# Patient Record
Sex: MALE | Race: WHITE | NOT HISPANIC OR LATINO | Employment: FULL TIME | URBAN - METROPOLITAN AREA
[De-identification: names, ages, dates, MRNs, and addresses within clinical notes are randomized per-mention and may not be internally consistent; named-entity substitution may affect disease eponyms.]

---

## 2017-10-02 ENCOUNTER — HISTORICAL (OUTPATIENT)
Dept: LAB | Facility: HOSPITAL | Age: 52
End: 2017-10-02

## 2019-02-18 ENCOUNTER — HISTORICAL (OUTPATIENT)
Dept: LAB | Facility: HOSPITAL | Age: 54
End: 2019-02-18

## 2020-06-05 ENCOUNTER — HISTORICAL (OUTPATIENT)
Dept: ADMINISTRATIVE | Facility: HOSPITAL | Age: 55
End: 2020-06-05

## 2021-11-15 ENCOUNTER — HISTORICAL (OUTPATIENT)
Dept: ADMINISTRATIVE | Facility: HOSPITAL | Age: 56
End: 2021-11-15

## 2021-11-15 LAB
ABS NEUT (OLG): 5.5 X10(3)/MCL (ref 2.1–9.2)
ALBUMIN SERPL-MCNC: 4.4 GM/DL (ref 3.4–5)
ALBUMIN/GLOB SERPL: 1.91 {RATIO} (ref 1.5–2.5)
ALP SERPL-CCNC: 75 UNIT/L (ref 38–126)
ALT SERPL-CCNC: 32 UNIT/L (ref 7–52)
APPEARANCE, UA: CLEAR
AST SERPL-CCNC: 20 UNIT/L (ref 15–37)
BACTERIA #/AREA URNS AUTO: NORMAL /HPF
BILIRUB SERPL-MCNC: 0.7 MG/DL (ref 0.2–1)
BILIRUB UR QL STRIP: NEGATIVE MG/DL
BILIRUBIN DIRECT+TOT PNL SERPL-MCNC: 0.2 MG/DL (ref 0–0.5)
BILIRUBIN DIRECT+TOT PNL SERPL-MCNC: 0.5 MG/DL
BUN SERPL-MCNC: 14 MG/DL (ref 7–18)
CALCIUM SERPL-MCNC: 9.5 MG/DL (ref 8.5–10.1)
CHLORIDE SERPL-SCNC: 103 MMOL/L (ref 98–107)
CHOLEST SERPL-MCNC: 137 MG/DL (ref 0–200)
CHOLEST/HDLC SERPL: 3.7 {RATIO}
CO2 SERPL-SCNC: 25 MMOL/L (ref 21–32)
COLOR UR: YELLOW
CREAT SERPL-MCNC: 0.81 MG/DL (ref 0.6–1.3)
ERYTHROCYTE [DISTWIDTH] IN BLOOD BY AUTOMATED COUNT: 12.9 % (ref 11.5–17)
EST CREAT CLEARANCE SER (OHS): 279.42 ML/MIN
EST. AVERAGE GLUCOSE BLD GHB EST-MCNC: 103 MG/DL
GLOBULIN SER-MCNC: 2.3 GM/DL (ref 1.2–3)
GLUCOSE (UA): NEGATIVE MG/DL
GLUCOSE SERPL-MCNC: 108 MG/DL (ref 74–106)
HBA1C MFR BLD: 5.2 % (ref 4.4–6.4)
HCT VFR BLD AUTO: 46.7 % (ref 42–52)
HDLC SERPL-MCNC: 37 MG/DL (ref 35–60)
HGB BLD-MCNC: 15.6 GM/DL (ref 14–18)
HGB UR QL STRIP: NEGATIVE UNIT/L
KETONES UR QL STRIP: NEGATIVE MG/DL
LDLC SERPL CALC-MCNC: 61 MG/DL (ref 0–129)
LEUKOCYTE ESTERASE UR QL STRIP: NEGATIVE UNIT/L
LYMPHOCYTES # BLD AUTO: 1.9 X10(3)/MCL (ref 0.6–3.4)
LYMPHOCYTES NFR BLD AUTO: 25 % (ref 13–40)
MCH RBC QN AUTO: 30.7 PG (ref 27–31.2)
MCHC RBC AUTO-ENTMCNC: 33 GM/DL (ref 32–36)
MCV RBC AUTO: 92 FL (ref 80–94)
MONOCYTES # BLD AUTO: 0.3 X10(3)/MCL (ref 0.1–1.3)
MONOCYTES NFR BLD AUTO: 3.4 % (ref 0.1–24)
NEUTROPHILS NFR BLD AUTO: 71.6 % (ref 47–80)
NITRITE UR QL STRIP.AUTO: NEGATIVE
PH UR STRIP: 6.5 [PH]
PLATELET # BLD AUTO: 211 X10(3)/MCL (ref 130–400)
PMV BLD AUTO: 9.5 FL (ref 9.4–12.4)
POTASSIUM SERPL-SCNC: 4.5 MMOL/L (ref 3.5–5.1)
PROT SERPL-MCNC: 6.7 GM/DL (ref 6.4–8.2)
PROT UR QL STRIP: NORMAL MG/DL
PSA SERPL-MCNC: 0.49 NG/ML (ref 0–3.5)
RBC # BLD AUTO: 5.08 X10(6)/MCL (ref 4.7–6.1)
RBC #/AREA URNS HPF: NORMAL /HPF
SODIUM SERPL-SCNC: 139 MMOL/L (ref 136–145)
SP GR UR STRIP: 1.02
SQUAMOUS EPITHELIAL, UA: NORMAL /LPF
TRIGL SERPL-MCNC: 113 MG/DL (ref 30–150)
TSH SERPL-ACNC: 1.28 MIU/ML (ref 0.35–4.94)
UROBILINOGEN UR STRIP-ACNC: 0.2 MG/DL
VLDLC SERPL CALC-MCNC: 22.6 MG/DL
WBC # SPEC AUTO: 7.7 X10(3)/MCL (ref 4.5–11.5)
WBC #/AREA URNS AUTO: NORMAL /[HPF]

## 2022-04-07 ENCOUNTER — HISTORICAL (OUTPATIENT)
Dept: ADMINISTRATIVE | Facility: HOSPITAL | Age: 57
End: 2022-04-07

## 2022-04-24 VITALS
OXYGEN SATURATION: 96 % | SYSTOLIC BLOOD PRESSURE: 136 MMHG | BODY MASS INDEX: 38.36 KG/M2 | DIASTOLIC BLOOD PRESSURE: 86 MMHG | WEIGHT: 315 LBS | HEIGHT: 76 IN

## 2022-05-01 NOTE — HISTORICAL [facility] CERNER
This is a historical note converted from Evan. Formatting and pictures may have been removed.  Please reference Evan for original formatting and attached multimedia. Chief Complaint  CPX/FASTING  History of Present Illness  Patient is here today?for wellness CPX. ?He continues to take atorvastatin 20 mg without side effects.? He did get J&J Covid vaccine and last week got a Moderna?Covid booster.? He has not lost any weight over the last year?and is still 428 pounds.? Most of his complaints today are related to his?weight?including?hip, knee?and ankle pain.? We had a long discussion about?trying to count calories?to help him lose weight. ?He is not interested in bariatric surgery.? Encouraged him to?increase low impact?exercise such as brisk walking?and try to get at least 150 minutes a week.? He does have sleep apnea and complains of increased fatigue. ?He is using an oral appliance?but is having difficulty with it. ?I urged him to contact his dentist who prescribed?the device.? His repeat colonoscopy is overdue.  Review of Systems  GENERAL:?no? unexplained wt ?loss or gain, no fever, + fatigue, no chills, night sweats or ?weakness  HEENT: no?? sore throat, ?ear pain, ?sinus pressure, ?nasal congestion, or ?rhinorrhea  VISION:?no ?vision changes, ?glaucoma, cataracts--legally blind  CARDIAC: no? chest pain,??palpitations,?Dyspnea on exertion, ?orthopnea  RESPIRATORY:?no??cough,?wheezing, sputum production or?SOB-- got covid vaccine --JJ covid vaccine and modernal booster  GI: no???abdominal pain, n&v,?constipation, diarrhea,??blood in stool or_no ?family history of colon cancer?_  :?no? dysuria, ?hematuria, ?frequency, urgency, ?incontinence,? testicular pain/swelling,?_no ?family history of prostate cancer_  [facility]:? no? myalgia, ?weakness, edema,? knees/hips/ankles LBP arthralgia, or ?joint effusion  SKIN:? No?rash, hives,?itching or?sores  NEURO:? No?headaches, numbness, tingling,? weakness, or  ?dizziness  PSYCH:??+work stressors??anxiety, depression, ?irritability, ?suicidal ideation or hallucinations  ENDO:? No ?polyuria, ?polydipsia, ?polyphagia  HEME:? No Bruising, lymphadenopathy, bleeding disorders ?or?signs of anemia  Physical Exam  Vitals & Measurements  HR:?82(Peripheral)? BP:?136/86?  HT:?194.20?cm? WT:?194.000?kg? BMI:?51.44?  GENERAL: NAD, alert and oriented x 3  SKIN:? no rash or abnormal appearing skin lesions  HEENT:? PERRLA, EOMI, mouth wnl, throat wnl, EAC and TM wnl bilaterally  NECK:? FROM, no lymphadenopathy, no thyroid abnormalities palpable  CHEST:? CTA bilaterally no wheezes, crackles or rubs  CARDIAC:? RRR, no murmurs audible  ABDOMEN:? Soft, nontender, nondistended, NBSx4,?no rebound or guarding, no HSM  EXTREMITIES:? no clubbing, cyanosis, or edema.? joints wnl. +2 DP/PT pulse bilaterally  NEURO:? no sensory or motor deficits noted. CN II-XII intact. Gait wnl.?  GENITAL: normal testes, no hernia  RECTAL: no hemorrhoids or fissures, prostate slightly enlarged, smooth surface, unable to palpate proximal prostate gland due to size  Assessment/Plan  1.?Wellness examination?Z00.00  CBC, CMP, FLP, PSA, U/A, A1C, TSH ?colonoscopy 2015 due 2020 Dr Omalley, Encourage pt to increase cardiovascular exercise and attempt to obtain at least 150 minutes of moderate aerobic exercise per week or 75 minutes of vigorous aerobic exercise weekly.  Ordered:  CBC w/ Auto Diff, Routine collect, 11/15/21 9:15:00 CST, Blood, Order for future visit, Stop date 11/15/21 9:15:00 CST, Lab Collect, Wellness examination  Obstructive sleep apnea of adult, 11/15/21 9:15:00 CST  Clinic Follow-Up Wellness, *Est. 11/15/22 3:00:00 CST, Order for future visit, Wellness examination  Hypercholesterolemia  H/O hyperglycemia  Obstructive sleep apnea of adult, [facility] AFP  Comprehensive Metabolic Panel, Routine collect, 11/15/21 9:15:00 CST, Blood, Order for future visit, Stop date 11/15/21 9:15:00 CST, Lab Collect,  Wellness examination  Hypercholesterolemia, 11/15/21 9:15:00 CST  External Referral, colonoscopy due, GI, Dr Omalley, 11/15/21 9:17:00 CST, Wellness examination  Hemoglobin A1c, Routine collect, 11/15/21 9:15:00 CST, Blood, Order for future visit, Stop date 11/15/21 9:15:00 CST, Lab Collect, Wellness examination  H/O hyperglycemia, 11/15/21 9:15:00 CST  Lab Collection Request, 11/15/21 9:15:00 CST, [facility] AMB - AFP, 11/15/21 9:15:00 CST, Wellness examination  Lipid Panel, Routine collect, 11/15/21 9:15:00 CST, Blood, Order for future visit, Stop date 11/15/21 9:15:00 CST, Lab Collect, Wellness examination  Hypercholesterolemia, 11/15/21 9:15:00 CST  Preventative Health Care Est 40-64 years [id number] PC, Wellness examination  Hypercholesterolemia  H/O hyperglycemia  Obstructive sleep apnea of adult, [facility] AMB - AFP, 11/15/21 9:16:00 CST  Thyroid Stimulating Hormone, Routine collect, 11/15/21 9:15:00 CST, Blood, Order for future visit, Stop date 11/15/21 9:15:00 CST, Lab Collect, Wellness examination  Obstructive sleep apnea of adult, 11/15/21 9:15:00 CST  Urinalysis no Reflex, Routine collect, Urine, Order for future visit, 11/15/21 9:15:00 CST, Stop date 11/15/21 9:15:00 CST, Nurse collect, Wellness examination  Hypercholesterolemia  ?  2.?Prostate cancer screening?Z12.5  ?PSA  Ordered:  Prostate Specific Antigen, Routine collect, 11/15/21 9:15:00 CST, Blood, Order for future visit, Stop date 11/15/21 9:15:00 CST, Lab Collect, Prostate cancer screening, 11/15/21 9:15:00 CST  ?  3.?Immunization due?Z23  ?flu shot today , shingrix, got JJ then moderna booster  Ordered:  Influenza Virus Vaccine, Inactivated, 0.5 mL, IM, Once, first dose 11/15/21 9:16:00 CST, stop date 11/15/21 9:16:00 CST  ?  4.?Hypercholesterolemia?E78.00  ?continue atorvastatin 20 mg  Ordered:  Clinic Follow-Up Wellness, *Est. 11/15/22 3:00:00 CST, Order for future visit, Wellness examination  Hypercholesterolemia  H/O hyperglycemia   Obstructive sleep apnea of adult, [facility]  Comprehensive Metabolic Panel, Routine collect, 11/15/21 9:15:00 CST, Blood, Order for future visit, Stop date 11/15/21 9:15:00 CST, Lab Collect, Wellness examination  Hypercholesterolemia, 11/15/21 9:15:00 CST  Lipid Panel, Routine collect, 11/15/21 9:15:00 CST, Blood, Order for future visit, Stop date 11/15/21 9:15:00 CST, Lab Collect, Wellness examination  Hypercholesterolemia, 11/15/21 9:15:00 CST  Preventative Health Care Est 40-64 years [id number] PC, Wellness examination  Hypercholesterolemia  H/O hyperglycemia  Obstructive sleep apnea of adult, [facility], 11/15/21 9:16:00 CST  Urinalysis no Reflex, Routine collect, Urine, Order for future visit, 11/15/21 9:15:00 CST, Stop date 11/15/21 9:15:00 CST, Nurse collect, Wellness examination  Hypercholesterolemia  ?  5.?H/O hyperglycemia?Z86.39  ?check A1C  Ordered:  Clinic Follow-Up Wellness, *Est. 11/15/22 3:00:00 CST, Order for future visit, Wellness examination  Hypercholesterolemia  H/O hyperglycemia  Obstructive sleep apnea of adult, [facility]  Hemoglobin A1c, Routine collect, 11/15/21 9:15:00 CST, Blood, Order for future visit, Stop date 11/15/21 9:15:00 CST, Lab Collect, Wellness examination  H/O hyperglycemia, 11/15/21 9:15:00 CST  [facility] Health Care Est 40-64 years [id number] PC, Wellness examination  Hypercholesterolemia  H/O hyperglycemia  Obstructive sleep apnea of adult, [facility], 11/15/21 9:16:00 CST  ?  6.?Obstructive sleep apnea of adult?G47.33  ?uses oral appliance  Ordered:  CBC w/ Auto Diff, Routine collect, 11/15/21 9:15:00 CST, Blood, Order for future visit, Stop date 11/15/21 9:15:00 CST, Lab Collect, Wellness examination  Obstructive sleep apnea of adult, 11/15/21 9:15:00 CST  Clinic Follow-Up Wellness, *Est. 11/15/22 3:00:00 CST, Order for future visit, Wellness examination  Hypercholesterolemia  H/O hyperglycemia  Obstructive sleep apnea of adult, [facility]  Preventative  Health Care Est 40-64 years [id number] PC, Wellness examination  Hypercholesterolemia  H/O hyperglycemia  Obstructive sleep apnea of adult, [facility] AMB - AFP, 11/15/21 9:16:00 CST  Thyroid Stimulating Hormone, Routine collect, 11/15/21 9:15:00 CST, Blood, Order for future visit, Stop date 11/15/21 9:15:00 CST, Lab Collect, Wellness examination  Obstructive sleep apnea of adult, 11/15/21 9:15:00 CST  ?  Orders:  atorvastatin, See Instructions, TAKE 1 TABLET BY MOUTH AT BEDTIME, # 90 tab(s), 3 Refill(s)  Referrals  External Referral, colonoscopy due, GI, Dr Omalley, 11/15/21 9:17:00 CST, Wellness examination  Clinic Follow-Up Wellness, *Est. 11/15/22 3:00:00 CST, Order for future visit, Wellness examination  Hypercholesterolemia  H/O hyperglycemia  Obstructive sleep apnea of adult, [facility] AFP   Problem List/Past Medical History  Ongoing  Acute gingival inflammation  Acute URI  Bilateral visual impairment  Cellulitis  Chronic upset stomach  H/O hyperglycemia  Immunization due  Morbid obesity  Numbness and tingling in left hand  Trapezius strain  Wellness examination  Historical  Cytomegaloviral mononucleosis  Obstructive sleep apnea of adult  Positive PPD  Serum low density lipoprotein/high density lipoprotein ratio measurement  Procedure/Surgical History  Colonoscopy (09/28/2015)  CYST REMOVED FROM RIGHT FOOT  right foot x 2  Tonsillectomy and adenoidectomy   Medications  atorvastatin 20 mg oral tablet, See Instructions, 3 refills  Influenza Virus Vaccine, Inactivated, 0.5 mL, IM, Once  Probiotic Formula (Bacillus Coagulans) oral capsule, 1 cap(s), Oral, Daily  Allergies  No Known Allergies  No Known Medication Allergies  Social History  Abuse/Neglect  No, 11/15/2021  No, 11/09/2020  No, 06/05/2020  No, 06/05/2020  No, 11/04/2019  Alcohol  Current, Liquor, 1-2 times per month, 11/15/2021  Current, 10/29/2018  Current, 1-2 times per month, 03/17/2018  Employment/School  Employed, 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[employer].., 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/2018  Employed, 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[employer]., 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/2018  Home/Environment  Lives with Children, Spouse., 10/29/2018  Lives with Children, Spouse., 10/25/2018  Tobacco  Former smoker, quit more than 30 days ago, No, 11/15/2021  Never (less than 100 in lifetime), No, 11/09/2020  Never (less than 100 in lifetime), N/A, 06/05/2020  Never (less than 100 in lifetime), N/A, 11/04/2019  Never (less than 100 in lifetime), N/A, 05/30/2019  Never (less than 100 in lifetime), N/A, 04/09/2019  Former smoker, quit more than 30 days ago, N/A, 02/20/2019  Former smoker, quit more than 30 days ago, N/A, 02/18/2019  Former smoker, Cigars, N/A, 10/29/2018  Light tobacco smoker, Cigars, N/A, 10/25/2018  Former smoker, 03/17/2018  Family History  Arthritis: Mother.  COPD (chronic obstructive pulmonary disease).: Father.  Diabetes mellitus type 2: Mother.  Drug addiction.: Sister.  Neuropathy: Father.  Peripheral vascular disease.: Father.  Primary malignant neoplasm of breast: Mother.  Immunizations  Vaccine Date Status Comments   COVID-19 mRNA, LNP-S, PF - Moderna 11/08/2021 Recorded    COVID-19, vector nr, [id number] - Ronaldo 03/09/2021 Recorded    influenza virus vaccine, inactivated 11/09/2020 Given    influenza virus vaccine, inactivated 11/04/2019 Given    influenza virus vaccine, inactivated - Not Given Expectation Not Necessary     influenza virus vaccine, inactivated 10/29/2018 Given    tetanus/diphth/pertuss (Tdap) adult/adol 10/23/2017 Recorded    tetanus/diphth/pertuss (Tdap) adult/adol 10/2017 Recorded    influenza virus vaccine, inactivated 11/28/2015 Given    tetanus/diphth/pertuss (Tdap) adult/adol 09/30/2008 Recorded    Health Maintenance  Health Maintenance  ???Pending?(in the next year)  ??? ??OverDue  ??? ? ? ?Alcohol Misuse Screening due??01/02/21??and every 1??year(s)  ??? ??Due?  ??? ? ? ?ADL Screening due??11/15/21??and every 1??year(s)  ??? ? ?  ?Aspirin Therapy for CVD Prevention due??11/15/21??and every 1??year(s)  ??? ? ? ?Depression Screening due??11/15/21??Unknown Frequency  ??? ? ? ?Zoster Vaccine due??11/15/21??Unknown Frequency  ??? ??Due In Future?  ??? ? ? ?Obesity Screening not due until??01/01/22??and every 1??year(s)  ???Satisfied?(in the past 1 year)  ??? ??Satisfied?  ??? ? ? ?Blood Pressure Screening on??11/15/21.??Satisfied by Ashok Anguiano MD  ??? ? ? ?Body Mass Index Check on??11/15/21.??Satisfied by Kourtney Acosta CMA  ??? ? ? ?Influenza Vaccine on??11/15/21.??Satisfied by Ashok Anguiano MD  ??? ? ? ?Obesity Screening on??11/15/21.??Satisfied by Kourtney Acosta CMA  ?

## 2022-06-15 PROBLEM — H54.3 VISUAL IMPAIRMENT IN BOTH EYES: Status: ACTIVE | Noted: 2022-06-15

## 2022-06-15 PROBLEM — E66.01 MORBID OBESITY: Status: ACTIVE | Noted: 2022-06-15

## 2022-06-15 PROBLEM — I10 PRIMARY HYPERTENSION: Status: ACTIVE | Noted: 2022-06-15

## 2022-06-16 PROBLEM — K59.00 CONSTIPATION: Status: ACTIVE | Noted: 2022-06-16

## 2022-07-25 PROCEDURE — 87070 CULTURE OTHR SPECIMN AEROBIC: CPT | Performed by: FAMILY MEDICINE

## 2022-11-26 PROBLEM — Z12.5 PROSTATE CANCER SCREENING: Status: ACTIVE | Noted: 2022-11-26

## 2022-11-26 PROBLEM — E78.00 HYPERCHOLESTEROLEMIA: Status: ACTIVE | Noted: 2022-11-26

## 2022-11-26 PROBLEM — R73.9 HYPERGLYCEMIA: Status: ACTIVE | Noted: 2022-11-26

## 2022-11-26 PROBLEM — G47.33 OSA ON CPAP: Status: ACTIVE | Noted: 2022-11-26

## 2022-11-26 PROBLEM — Z23 IMMUNIZATION DUE: Status: ACTIVE | Noted: 2022-11-26

## 2022-11-26 PROBLEM — Z00.00 ENCOUNTER FOR WELLNESS EXAMINATION IN ADULT: Status: ACTIVE | Noted: 2022-11-26

## 2023-02-27 PROBLEM — Z00.00 ENCOUNTER FOR WELLNESS EXAMINATION IN ADULT: Status: RESOLVED | Noted: 2022-11-26 | Resolved: 2023-02-27

## 2023-12-18 PROCEDURE — 86803 HEPATITIS C AB TEST: CPT | Performed by: FAMILY MEDICINE

## 2023-12-18 PROCEDURE — 87389 HIV-1 AG W/HIV-1&-2 AB AG IA: CPT | Performed by: FAMILY MEDICINE

## 2024-03-18 PROBLEM — Z00.00 ENCOUNTER FOR WELLNESS EXAMINATION IN ADULT: Status: RESOLVED | Noted: 2022-11-26 | Resolved: 2024-03-18

## 2024-04-01 NOTE — PROGRESS NOTES
Moira Vick    HPI: Mr. Moiar Vick is a 58 y.o. male in clinic today for non surgical weight loss. Pt here to discuss weight management and possibly weight loss medication.     Weight gain began during childhood.   Lowest weight pt has been in adult life: 183# during highschool, college 200#, slow increase since that time an more since his first child. Significant increase after he got off of medifast and through covid.   Highest weight     : 463#  Previous weight loss medications the patient has tried: none   Pervious diets the patient has tried:  medifast  Most weight the pt has lost: 140# on medifast but did not continue after   Wishes to get to a weight of 240# and has other goals of play music, move for functionally, does every he want to do and not feel limited, has a grandchild on the way. .     Current diet consists of more of a sweet tooth.   His current exercise includes none 0 times a week.   Pt does not have limitations but also feels like he cannot exercise at this time just do to inability to move much and ADLS make him SOB and break out into a sweat.     Pt is NOT interested in weight loss surgery     Labs (12/18/23): A1c: 5.7%, all other labs WNLs    HT:   Weights  NSWL Consult (04/02/2024): 463#  BMI: 62    Pertinent History:  HTN - [x] Yes   [] No  HLD - [x] Yes   [] No  DM  -  [] Yes   [x] No, pre DM. A1c: 5.7%  ELI - [x] Yes   [] No  GERD - [] Yes   [x] No  Anticoagulation- [] Yes   [x] No    Patient Care Team:  Ashok Anguiano MD as PCP - General (Family Medicine)     Subjective:     12 point review of systems conducted, negative except as stated in the history of present illness. See HPI for details.    Review of patient's allergies indicates:  No Known Allergies  Past Medical History:   Diagnosis Date    High serum low density lipoprotein (LDL) cholesterol     HTN (hypertension)     Hypercholesterolemia     Morbid obesity     Obstructive sleep apnea     wears oral appliance     Positive PPD     took 6 months meds    Trapezius strain     Visual impairment near total bilaterally      Past Surgical History:   Procedure Laterality Date    COLONOSCOPY W/ BIOPSIES AND POLYPECTOMY  2015    5 year/Dr. Omalley    CYST REMOVAL Right     FOOT    FOOT SURGERY      X2    TONSILLECTOMY AND ADENOIDECTOMY       Family History   Problem Relation Age of Onset    Arthritis Mother     Diabetes type II Mother     Breast cancer Mother     COPD Father     Neuropathy Father     Peripheral vascular disease Father     Drug abuse Sister      Social History     Tobacco Use    Smoking status: Former     Current packs/day: 0.00     Average packs/day: 0.5 packs/day for 2.0 years (1.0 ttl pk-yrs)     Types: Cigarettes, Cigars     Start date:      Quit date: 2018     Years since quittin.2    Smokeless tobacco: Never   Substance Use Topics    Alcohol use: Not Currently     Comment: rare    Drug use: Never      Current Outpatient Medications   Medication Instructions    atorvastatin (LIPITOR) 20 mg, Oral, Daily    fluticasone propionate (FLONASE) 50 mcg, Each Nostril, Daily    Lactobacillus rhamnosus GG (CULTURELLE) 10 billion cell capsule 1 capsule, Oral, Daily    losartan-hydrochlorothiazide 100-25 mg (HYZAAR) 100-25 mg per tablet 1 tablet, Oral, Daily       Objective:     Vital Signs (Most Recent):  Visit Vitals  BP (!) 152/90   Pulse 76   Ht 6' (1.829 m)   Wt (!) 210.4 kg (463 lb 12.8 oz)   BMI 62.90 kg/m²       Physical Exam:  General:  Alert and oriented.    Respiratory:  Lungs are clear to auscultation, Respirations are non-labored, Breath sounds are equal.    Cardiovascular:  Normal rate, Regular rhythm, No murmur.    Gastrointestinal:  Soft, Non-tender, Non-distended, Normal bowel sounds        Musculoskeletal:  Normal range of motion, Normal strength.    Integumentary:  Warm, Dry, Pink.    Neurologic:  Alert, Oriented.    Psychiatric:  Cooperative.      Assessment:         ICD-10-CM ICD-9-CM   1.  Hyperlipidemia, unspecified hyperlipidemia type  E78.5 272.4   2. Hypertension, unspecified type  I10 401.9   3. ELI (obstructive sleep apnea)  G47.33 327.23   4. Encounter for weight loss counseling  Z71.3 V65.3   5. Exercise counseling  Z71.82 V65.41   6. Morbid obesity  E66.01 278.01   7. Dietary counseling  Z71.3 V65.3        Plan:     1. Hyperlipidemia, unspecified hyperlipidemia type    2. Hypertension, unspecified type    3. ELI (obstructive sleep apnea)    4. Encounter for weight loss counseling    5. Exercise counseling    6. Morbid obesity  Overview:  Encourage weight loss, diet and exercise      7. Dietary counseling         Plan:  Monitor blood pressure at home with cuff and keep log. Make sure not to forget and take blood pressure medication daily   Discussed importance of strict dietary compliance as recommended by dietitian. Limit processed foods and starchy CHOs. Eliminate high calorie liquids. Increased water intake. Practice mindful eating patterns. Recommend food journaling for accountability.  Discussed green heart meals as well as freshter  Exercise  Increase activities of daily living and NEAT exercises  Discussed types of weight loss medications, risks/benefits of these medications.   Pt not interested in weight loss medication this month. We will see how diet and exercise goes and revisit next month  Pt is NOT interested in weight loss surgery   Discussed plan of care with medical bariatrician Dr. Zack Bowling and he was in agreement with proposed treatment plan.   Keep routine scheduled appointments with PCP/specialists.   FU 1 month

## 2024-04-02 ENCOUNTER — CLINICAL SUPPORT (OUTPATIENT)
Dept: BARIATRICS | Facility: HOSPITAL | Age: 59
End: 2024-04-02

## 2024-04-02 ENCOUNTER — OFFICE VISIT (OUTPATIENT)
Dept: SURGERY | Facility: CLINIC | Age: 59
End: 2024-04-02
Payer: COMMERCIAL

## 2024-04-02 VITALS
DIASTOLIC BLOOD PRESSURE: 90 MMHG | HEIGHT: 72 IN | WEIGHT: 315 LBS | HEART RATE: 76 BPM | SYSTOLIC BLOOD PRESSURE: 152 MMHG | BODY MASS INDEX: 42.66 KG/M2

## 2024-04-02 VITALS — BODY MASS INDEX: 56.46 KG/M2 | WEIGHT: 315 LBS

## 2024-04-02 DIAGNOSIS — G47.33 OSA (OBSTRUCTIVE SLEEP APNEA): ICD-10-CM

## 2024-04-02 DIAGNOSIS — Z71.3 ENCOUNTER FOR WEIGHT LOSS COUNSELING: ICD-10-CM

## 2024-04-02 DIAGNOSIS — E66.9 OBESITY: Primary | ICD-10-CM

## 2024-04-02 DIAGNOSIS — E66.9 OBESITY, UNSPECIFIED CLASSIFICATION, UNSPECIFIED OBESITY TYPE, UNSPECIFIED WHETHER SERIOUS COMORBIDITY PRESENT: Primary | ICD-10-CM

## 2024-04-02 DIAGNOSIS — I10 HYPERTENSION, UNSPECIFIED TYPE: ICD-10-CM

## 2024-04-02 DIAGNOSIS — Z71.3 DIETARY COUNSELING: ICD-10-CM

## 2024-04-02 DIAGNOSIS — E66.01 MORBID OBESITY: ICD-10-CM

## 2024-04-02 DIAGNOSIS — E78.5 HYPERLIPIDEMIA, UNSPECIFIED HYPERLIPIDEMIA TYPE: Primary | ICD-10-CM

## 2024-04-02 DIAGNOSIS — Z71.82 EXERCISE COUNSELING: ICD-10-CM

## 2024-04-02 PROCEDURE — 94200023 HC BARIATRIC CONSULT - 60 MINS

## 2024-04-02 PROCEDURE — 99205 OFFICE O/P NEW HI 60 MIN: CPT | Mod: ,,, | Performed by: NURSE PRACTITIONER

## 2024-04-02 NOTE — PROGRESS NOTES
A NONsurgical medically supervised weight loss visit was conducted today.  Patient's weight is 463.8 LBS. Moira is an  of [employer]. He states that his job is stressful. He has tried losing weight on his own by doing Medifast. He did get down to the upper 200's. He is not exercising because he is in pain.       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 Sitting at his desk most of the day.  - Patient will practice mindful eating behaviors (handout).  - Patient will follow dietary advice from Anny Paez RD.    It is my professional opinion that patient is in the contemplation stage of behavior change.    AMIE Gilbert, CPT, CHC

## 2024-04-02 NOTE — PROGRESS NOTES
NUTRITIONAL CONSULT  Non-surgical weight loss      PAST HISTORY:   Dieting attempts include Optavia/ Medifast  Highest adult weight: 463#  How many years at present wt:  a couple years - has always yoyo   Greatest single wt loss:  140#    11 years ago on medifast     CLINICAL DATA:  Height:   Ht Readings from Last 1 Encounters:   24 6' (1.829 m)      Weight:   Wt Readings from Last 3 Encounters:   24 1106 (!) 210.4 kg (463 lb 12.8 oz)   24 1054 (!) 210.4 kg (463 lb 12.8 oz)   02/15/24 0945 (!) 213.6 kg (471 lb)      IBW: 184 lbs   BMI:   BMI Readings from Last 1 Encounters:   24 62.90 kg/m²      Patient's goal weight: 250 lbs  under 300#    FAMILY HISTORY OF OBESITY:  Yes           WHAT SIDE OF THE FAMILY?   [x]Mother   []Father   []Sibling   [x]Child     []Extended    []Adopted     NUTRITION & HEALTH HISTORY:  Greatest challenge: starchy CHO, portion control, snacking at night, and irregular meal patterns    Current diet recall:   B: takes blood pressure med with egg mcmuffin   S: salad with nuts and PB cracker   D: take out     Current Dietary Patterns:  Meal pattern: 3  Snackin / day Type:   Vegetables: Likes a few. Eats 2-3 times per week.  Fruits: Likes a few. Eats 2-3 times per week.  Beverages: water and 2% milk  Alcohol consumption: Monthly. Type:   Dining out: Weekly. Mostly fast food, restaurants, and take-out.  Grocery shopping and food prep: Yes[x]Self   [x]Spouse   []Other:   Emotional eating: Yes Which emotions if yes: bored   Nighttime snacking: Yes Middle of night: Yes Before bedtime: Yes  Hx of disordered eating behaviors: No Anorexia: No Bulimia: No Purging: No Binging:No  History of vitamin/mineral deficiencies:   No      ASSESSMENT:  Patient reports attempts at weight loss, only to regain lost weight.  Patient demonstrated knowledge of healthy eating behaviors and exercise patterns; admits to not eating healthy and not exercising at this point.  Patient states  willingness and demonstrates willingness to change lifestyle and make behavior modifications.        ESTIMATED NEEDS:  Calories: 8144-4168 (20-25 kcal/kg adjusted BW/d)  Protein: 114-125.4 (1.0-1.1 g/kg adjusted BW/d)  Fluid:  2280 (20 mL/kg actual BW/d)      PLAN:  Eat 3 meals 1 snack   Portion out meals   Limit sugar beverages   Try pre made meals from green [facility]

## 2024-05-01 ENCOUNTER — OFFICE VISIT (OUTPATIENT)
Dept: SURGERY | Facility: CLINIC | Age: 59
End: 2024-05-01

## 2024-05-01 ENCOUNTER — CLINICAL SUPPORT (OUTPATIENT)
Dept: BARIATRICS | Facility: HOSPITAL | Age: 59
End: 2024-05-01

## 2024-05-01 VITALS
WEIGHT: 315 LBS | BODY MASS INDEX: 42.66 KG/M2 | WEIGHT: 315 LBS | HEIGHT: 72 IN | DIASTOLIC BLOOD PRESSURE: 88 MMHG | BODY MASS INDEX: 60.49 KG/M2 | SYSTOLIC BLOOD PRESSURE: 126 MMHG | HEART RATE: 64 BPM

## 2024-05-01 DIAGNOSIS — E66.01 CLASS 3 SEVERE OBESITY WITH BODY MASS INDEX (BMI) OF 60.0 TO 69.9 IN ADULT, UNSPECIFIED OBESITY TYPE, UNSPECIFIED WHETHER SERIOUS COMORBIDITY PRESENT: Primary | ICD-10-CM

## 2024-05-01 DIAGNOSIS — E66.9 OBESITY: Primary | ICD-10-CM

## 2024-05-01 DIAGNOSIS — E66.01 MORBID OBESITY: ICD-10-CM

## 2024-05-01 DIAGNOSIS — Z71.3 ENCOUNTER FOR WEIGHT LOSS COUNSELING: Primary | ICD-10-CM

## 2024-05-01 DIAGNOSIS — Z71.3 DIETARY COUNSELING: ICD-10-CM

## 2024-05-01 DIAGNOSIS — Z71.82 EXERCISE COUNSELING: ICD-10-CM

## 2024-05-01 PROCEDURE — 94200023 HC BARIATRIC CONSULT - 60 MINS

## 2024-05-01 PROCEDURE — 99214 OFFICE O/P EST MOD 30 MIN: CPT | Mod: ,,, | Performed by: NURSE PRACTITIONER

## 2024-05-01 NOTE — PROGRESS NOTES
Patient Education [] MSWL Visit Number:      []  Pre-op Wt Loss Goal (as ordered on referral):   [x] NSWL Visit: F/U    Height:   Ht Readings from Last 1 Encounters:   04/02/24 6' (1.829 m)      Weight:   Wt Readings from Last 3 Encounters:   05/01/24 1002 (!) 202.3 kg (446 lb)   04/02/24 1106 (!) 210.4 kg (463 lb 12.8 oz)   04/02/24 1054 (!) 210.4 kg (463 lb 12.8 oz)      BMI:   BMI Readings from Last 1 Encounters:   05/01/24 60.49 kg/m²                                                                          Barriers to learning:  [x]None evident  []Acuity of illness  []Cognitive defects  []Cultural barriers  []Desire/Motivation  []Difficulty concentrating  []Emotional state  []Financial concerns  []Hearing deficit  []Language barrier  []Literacy  []Memory problems  []Vision impairment     Home caregiver present for session   []YES  [x] NO       Teaching methods:  [x]Demonstration  [x]Explanation  []Printed materials  []Teach back  []Virtual/web based         Verbalizes understanding Demonstrates  Needs further teaching Needs practice/ supervision Comments    Bariatric Surgery Diet  [] [] [] []    Clear liquid [] [] [] []    Full liquid [] [] [] []    Weight Reduction [x] [] [] []    Other Diet [] [] [] []        Additional Learner (s) Present                                                                  [x]Spouse   []Daughter    []  Son  []Family member   []Friend   []Grandfather   []Grandmother   []Father   []Mother   []Other       Expected Compliance:  [x]Good  []Fair  []Poor    Additional Information:    Pt has lost 17# since last visit one month ago. He has made many changes including: eating [employer] prepared meals daily, monitoring his portion sizes, having less starches and more fruits, also snacks on protein options, and has cut out most sodas and drank more water. Praised pt for his progress and for his successful behavior changes. Advised pt to increase protein at meals if still having cravings  late at night. Also encouraged him to drink more water- bring extra bottles to work to drink them there. Pt also asked about snacking on fruits- told him this would be beneficial and encouraged this to increase his fiber intake and satiety as well.      24 hr recall:  Breakfast: Egg sausage mcmuffin  Lunch: salad kit, has some sort of meat/protein in it  Dinner: salad or green heart meals  Snacks: p3 pack, protein bar, protein brownies, fruit (occasionally has whole wheat chips)  Water: about 3 bottles daily, 0 sugar packets    PLAN: goal 234#   Continue [employer] meals   Drink 4 bottles water daily (bring extra bottles to work), continue limit soda  Increase protein at meals- help with cravings  Snack on fruits      ESTIMATED NEEDS:  Calories: 9709-7920 (20-25 kcal/kg adjusted BW/d)  Protein: 114-125.4     (1.0-1.1 g/kg adjusted BW/d)  Fluid:  2280    (20 mL/kg actual BW/d)

## 2024-05-01 NOTE — PROGRESS NOTES
Moira Vick    No chief complaint on file.    HPI: Mr. Moira Vick is a 58 y.o. male in clinic today for non surgical weight loss. Pt here to discuss weight management and possibly weight loss medication.   Pt very excited about his weight loss this month. Very motivated to continue dietary and exercise changes. Much more mindful of the food noise he is experiencing. Will continue to do green hearts meals and walk more at work.     Pt is NOT interested in weight loss surgery      Labs (12/18/23): A1c: 5.7%, all other labs WNLs     HT:   Weights  NSWL Consult (04/02/2024): 463#                BMI: 62  NSWL 5/1/24: 446#    BMI: 60.4     Pertinent History:  HTN - [x] Yes   [] No  HLD - [x] Yes   [] No  DM  -  [] Yes   [x] No, pre DM. A1c: 5.7%  ELI - [x] Yes   [] No  GERD - [] Yes   [x] No  Anticoagulation- [] Yes   [x] No    Patient Care Team:  Ashok Anguiano MD as PCP - General (Family Medicine)     Subjective:     12 point review of systems conducted, negative except as stated in the history of present illness. See HPI for details.    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 Omalley    CYST REMOVAL Right     FOOT    FOOT SURGERY      X2    TONSILLECTOMY AND ADENOIDECTOMY       Family History   Problem Relation Name Age of Onset    Arthritis Mother      Diabetes type II Mother      Breast cancer Mother      COPD Father      Neuropathy Father      Peripheral vascular disease Father      Drug abuse Sister       Social History     Tobacco Use     Smoking status: Former     Current packs/day: 0.00     Average packs/day: 0.5 packs/day for 2.0 years (1.0 ttl pk-yrs)     Types: Cigarettes, Cigars     Start date:      Quit date: 2018     Years since quittin.3    Smokeless tobacco: Never   Substance Use Topics    Alcohol use: Not Currently     Comment: rare    Drug use: Never      Current Outpatient Medications   Medication Instructions    atorvastatin (LIPITOR) 20 mg, Oral, Daily    fluticasone propionate (FLONASE) 50 mcg, Each Nostril, Daily    Lactobacillus rhamnosus GG (CULTURELLE) 10 billion cell capsule 1 capsule, Oral, Daily    losartan-hydrochlorothiazide 100-25 mg (HYZAAR) 100-25 mg per tablet 1 tablet, Oral, Daily       Objective:     Vital Signs (Most Recent):  Visit Vitals  /88   Pulse 64   Ht 6' (1.829 m)   Wt (!) 202.3 kg (446 lb)   BMI 60.49 kg/m²       Physical Exam:  General:  Alert and oriented.    Respiratory:  Lungs are clear to auscultation, Respirations are non-labored, Breath sounds are equal.    Cardiovascular:  Normal rate, Regular rhythm, No murmur.    Gastrointestinal:  Soft, Non-tender, Non-distended, Normal bowel sounds        Musculoskeletal:  Normal range of motion, Normal strength.    Integumentary:  Warm, Dry, Pink.    Neurologic:  Alert, Oriented.    Psychiatric:  Cooperative.      Assessment:         ICD-10-CM ICD-9-CM   1. Encounter for weight loss counseling  Z71.3 V65.3   2. Dietary counseling  Z71.3 V65.3   3. Exercise counseling  Z71.82 V65.41   4. Morbid obesity  E66.01 278.01        Plan:     1. Encounter for weight loss counseling    2. Dietary counseling    3. Exercise counseling    4. Morbid obesity  Overview:  Encourage weight loss, diet and exercise           Plan:  Awesome job with weight loss and making changes to dietary and exercise habits!   Discussed importance of strict dietary compliance as recommended by dietitian. Limit processed foods and starchy CHOs. Eliminate high calorie liquids. Increased  water intake. Practice mindful eating patterns. Recommend food journaling for accountability.  Continue green heart meals   Exercise  Increase activities of daily living and NEAT exercises  Discussed types of weight loss medications, risks/benefits of these medications.   Pt not interested in weight loss medication this month.   Pt is NOT interested in weight loss surgery   Discussed plan of care with medical bariatrician Dr. Zack Bowling and he was in agreement with proposed treatment plan.   Keep routine scheduled appointments with PCP/specialists.   FU 1 month

## 2024-05-01 NOTE — PROGRESS NOTES
A NONsurgical medically supervised weight loss visit was conducted today.  Patient's weight is 446 LBS. A body composition was conducted and patient has lost 17.8 lbs. Patient was educated on results. He is walking around his work campus. He recently fell at home, but states that his pain is getting better. He is eating healthier and practicing mindful eating behaviors.      PLAN:  - Patient will continue with current exercise regimen and start strength training exercise (handouts given/attached).  - Patient will follow dietary advice from VAN Tompkins RD.    It is my professional opinion that patient is in the action stage of behavior change.    AMIE Gilbert, CPT, CHC

## 2024-06-03 ENCOUNTER — CLINICAL SUPPORT (OUTPATIENT)
Dept: BARIATRICS | Facility: HOSPITAL | Age: 59
End: 2024-06-03

## 2024-06-03 VITALS — BODY MASS INDEX: 58.68 KG/M2 | WEIGHT: 315 LBS

## 2024-06-03 DIAGNOSIS — E66.01 CLASS 3 SEVERE OBESITY WITHOUT SERIOUS COMORBIDITY WITH BODY MASS INDEX (BMI) OF 50.0 TO 59.9 IN ADULT, UNSPECIFIED OBESITY TYPE: Primary | ICD-10-CM

## 2024-06-03 DIAGNOSIS — E66.9 OBESITY: Primary | ICD-10-CM

## 2024-06-03 PROCEDURE — 94200023 HC BARIATRIC CONSULT - 60 MINS

## 2024-06-03 NOTE — PROGRESS NOTES
A nonsurgical medically supervised weight loss visit was conducted today.  Patient's weight is 432 lbs. He has lost 14 lbs. Since his visit last month. He is walking the campus 5 days a week for 15-20 min. He has not started the strengthening exercises given on last visit.  A body composition was conducted.    PLAN:  - Patient will continue with current exercise regimen and start strengthening exercises.   - Patient will follow dietary advice from VAN Tompkins RD.    It is my professional opinion that patient is in the action stage of behavior change.    AMIE Gilbert, CPT, CHC

## 2024-06-03 NOTE — PROGRESS NOTES
Patient Education [] MS[facility] Visit Number:      []  Pre-op Wt Loss Goal (as ordered on referral):   [x] NSWL Visit: F/U    Height:   Ht Readings from Last 1 Encounters:   05/01/24 6' (1.829 m)      Weight:   Wt Readings from Last 3 Encounters:   06/03/24 1100 (!) 196.3 kg (432 lb 11.2 oz)   05/01/24 1038 (!) 202.3 kg (446 lb)   05/01/24 1002 (!) 202.3 kg (446 lb)      BMI:   BMI Readings from Last 1 Encounters:   06/03/24 58.68 kg/m²                                                                          Barriers to learning:  [x]None evident  []Acuity of illness  []Cognitive defects  []Cultural barriers  []Desire/Motivation  []Difficulty concentrating  []Emotional state  []Financial concerns  []Hearing deficit  []Language barrier  []Literacy  []Memory problems  []Vision impairment     Home caregiver present for session   []YES  [x] NO       Teaching methods:  [x]Demonstration  [x]Explanation  []Printed materials  []Teach back  []Virtual/web based         Verbalizes understanding Demonstrates  Needs further teaching Needs practice/ supervision Comments    Bariatric Surgery Diet  [] [] [] []    Clear liquid [] [] [] []    Full liquid [] [] [] []    Weight Reduction [x] [] [] []    Other Diet [] [] [] []      Expected Compliance:  [x]Good  []Fair  []Poor    Additional Information:    Pt has lost 14# since last visit one month ago (and lost 17# month prior). He has continued to eat protein throughout day, snack on fruits and SF options, limits unnecessary carbohydrates/starches, and has cut back soda in diet. Rec'd to increase water from 2 bottles/day to 4 instead- reports he had been feeling low energy and dizzy at times. Also rec'd to plan ahead dinners or get [employer] to avoid eating out or grabbing a burger. Overall pt has been doing extremely well and has made many changes.    24 hr recall:  "[employer] egg McMuffin  Protein bar snack  Salad kit with chicken  SF pudding  Lean cuisine or burger  SF popsicle    Plan:  goal 234#   Increase water to 4 bottles daily- to reduce low energy/dizziness  Prep ahead for dinner- cook at home or do [employer] meals  Continue to monitor portion sizes and limit extra carbohydrates    ESTIMATED NEEDS:  Calories: 4493-0427 (20-25 kcal/kg adjusted BW/d)  Protein: 114-125.4     (1.0-1.1 g/kg adjusted BW/d)  Fluid:  2280    (20 mL/kg actual BW/d)

## 2024-07-08 ENCOUNTER — CLINICAL SUPPORT (OUTPATIENT)
Dept: BARIATRICS | Facility: HOSPITAL | Age: 59
End: 2024-07-08

## 2024-07-08 VITALS — BODY MASS INDEX: 57.67 KG/M2 | WEIGHT: 315 LBS

## 2024-07-08 DIAGNOSIS — E66.01 CLASS 3 SEVERE OBESITY WITHOUT SERIOUS COMORBIDITY WITH BODY MASS INDEX (BMI) OF 50.0 TO 59.9 IN ADULT, UNSPECIFIED OBESITY TYPE: Primary | ICD-10-CM

## 2024-07-08 DIAGNOSIS — E66.9 OBESITY: Primary | ICD-10-CM

## 2024-07-08 PROCEDURE — 94200023 HC BARIATRIC CONSULT - 60 MINS

## 2024-07-08 NOTE — PROGRESS NOTES
A nonsurgical medically supervised weight loss visit was conducted today.  Patient's weight is 425 LBS. He has lost 39 lbs. Since April. He has not been walking due to having COVID. He reports that he has had cravings for ice cream, but has not eaten it. Patient is practicing mindful eating behaviors. No issues/concerns at this time.       PLAN:  - Patient will continue with current exercise regimen when feeling better.  - Patient will follow dietary advice from VAN Tompkins RD.    It is my professional opinion that patient is in the action stage of behavior change.    AMIE Gilbert, CPT, CHC

## 2024-07-08 NOTE — PROGRESS NOTES
NON-SURGICAL WEIGHT LOSS FOLLOW UP    Height:   Ht Readings from Last 1 Encounters:   05/01/24 6' (1.829 m)      Weight:   Wt Readings from Last 3 Encounters:   07/08/24 0909 (!) 192.9 kg (425 lb 3.2 oz)   06/03/24 1100 (!) 196.3 kg (432 lb 11.2 oz)   05/01/24 1038 (!) 202.3 kg (446 lb)      Weight loss since previous visit:  lost 7# (39# total)    Changes in dietary patterns since previous visit: Pt has been steadily losing weight over past few months. Successfully changing eating behaviors like picking items with high protein and 0g added sugar, switching to SF dessert options at night, and drinking more water throughout the day. He has had Covid the past few days so he reports his appetite has decreased. Encouraged him to continue current diet but increase physical activity if he would like to expedite weight loss.     ESTIMATED NEEDS:  Calories: 9392-9357 (20-25 kcal/kg adjusted BW/d)  Protein: 114-125.4     (1.0-1.1 g/kg adjusted BW/d)  Fluid:  2280    (20 mL/kg actual BW/d)    Normal 24 hour recall:   Breakfast: Protein brownie or egg mcmuffin  Snack: Balance pack   Lunch: Salad with chicken  Snack: Protein bar or [employer] snack  Dinner: Lean protein and veggies  Snack: SF pudding or SF popsicles  Water: 4 bottles , no more sodas- 0coke occasionally    Plan:   Continue current eating regimen  Increase exercise per CAROLYNN Blanca note on 7/8/24

## 2024-08-05 ENCOUNTER — CLINICAL SUPPORT (OUTPATIENT)
Dept: BARIATRICS | Facility: HOSPITAL | Age: 59
End: 2024-08-05

## 2024-08-05 DIAGNOSIS — E66.01 CLASS 3 SEVERE OBESITY WITHOUT SERIOUS COMORBIDITY WITH BODY MASS INDEX (BMI) OF 50.0 TO 59.9 IN ADULT, UNSPECIFIED OBESITY TYPE: Primary | ICD-10-CM

## 2024-08-05 DIAGNOSIS — E66.9 OBESITY: Primary | ICD-10-CM

## 2024-08-05 PROCEDURE — 94200023 HC BARIATRIC CONSULT - 60 MINS

## 2024-09-09 ENCOUNTER — CLINICAL SUPPORT (OUTPATIENT)
Dept: BARIATRICS | Facility: HOSPITAL | Age: 59
End: 2024-09-09

## 2024-09-09 VITALS — WEIGHT: 315 LBS | BODY MASS INDEX: 55.44 KG/M2

## 2024-09-09 DIAGNOSIS — E66.01 CLASS 3 SEVERE OBESITY WITHOUT SERIOUS COMORBIDITY WITH BODY MASS INDEX (BMI) OF 50.0 TO 59.9 IN ADULT, UNSPECIFIED OBESITY TYPE: Primary | ICD-10-CM

## 2024-09-09 DIAGNOSIS — E66.9 OBESITY: Primary | ICD-10-CM

## 2024-09-09 PROCEDURE — 94200023 HC BARIATRIC CONSULT - 60 MINS

## 2024-09-09 NOTE — PROGRESS NOTES
A surgical medically supervised weight loss visit was conducted today.  Patient's weight is 408.8 lbs. And 412 lbs. On the body comp scale. Since his highest weight of 463 lbs. He has lost 52 lbs. Patient is walking a lot more because he feels like he can. He states that he walked around his block. He is eating healthy during the week and has a treat on the weekend (fried chicken). He reports that he doesn't emotionally eat.        PLAN:  - Patient will continue with current exercise regimen.  - Patient will follow dietary advice from VAN Tompkins RD.    It is my professional opinion that patient is in the action stage of behavior change.    AMIE Gilbert, CPT, CHC

## 2024-09-09 NOTE — PROGRESS NOTES
NON-SURGICAL WEIGHT LOSS FOLLOW UP    Height:   Ht Readings from Last 1 Encounters:   05/01/24 6' (1.829 m)      Weight:   Wt Readings from Last 3 Encounters:   09/09/24 0805 (!) 185.4 kg (408 lb 12.8 oz)   07/08/24 0909 (!) 192.9 kg (425 lb 3.2 oz)   06/03/24 1100 (!) 196.3 kg (432 lb 11.2 oz)      Weight loss since previous visit: lost 9#    Changes in dietary patterns since previous visit:  Maintaining steady eating routine during week  Making better choices if eating out on weekends (salad or burger with side of veggies)  Had fried food twice this month- instead of one time  Exercised/moved more    24 hour recall on weekday:  Protein brownie or egg mcmuffin  Balance pack  Salad- pre packaged from [employer]  Protein bar or nuts  Lean meat and veggies  SF protein [employer] sara's cup    Plan:   Limit fried food to once per month  Continue current diet regimen on weekdays  Limit starches to one serving per meal on weekends if eating out  Increase exercise per CAROLYNN Blanca note on 9/9/24

## 2024-10-10 ENCOUNTER — CLINICAL SUPPORT (OUTPATIENT)
Dept: BARIATRICS | Facility: HOSPITAL | Age: 59
End: 2024-10-10

## 2024-10-10 VITALS — BODY MASS INDEX: 55.54 KG/M2 | WEIGHT: 315 LBS

## 2024-10-10 DIAGNOSIS — E66.9 OBESITY: Primary | ICD-10-CM

## 2024-10-10 DIAGNOSIS — E66.813 CLASS 3 SEVERE OBESITY WITHOUT SERIOUS COMORBIDITY WITH BODY MASS INDEX (BMI) OF 50.0 TO 59.9 IN ADULT, UNSPECIFIED OBESITY TYPE: Primary | ICD-10-CM

## 2024-10-10 DIAGNOSIS — E66.01 CLASS 3 SEVERE OBESITY WITHOUT SERIOUS COMORBIDITY WITH BODY MASS INDEX (BMI) OF 50.0 TO 59.9 IN ADULT, UNSPECIFIED OBESITY TYPE: Primary | ICD-10-CM

## 2024-10-10 PROCEDURE — 94200023 HC BARIATRIC CONSULT - 60 MINS

## 2024-10-10 NOTE — PROGRESS NOTES
NON-SURGICAL WEIGHT LOSS FOLLOW UP    Height:   Ht Readings from Last 1 Encounters:   05/01/24 6' (1.829 m)      Weight:   Wt Readings from Last 3 Encounters:   10/10/24 1336 (!) 185.7 kg (409 lb 8 oz)   09/09/24 0805 (!) 185.4 kg (408 lb 12.8 oz)   07/08/24 0909 (!) 192.9 kg (425 lb 3.2 oz)      Weight loss since previous visit: lost 2.5#    Changes in dietary patterns since previous visit:  Pt admits to emotional eating due to stress of recent death in family  Says has gotten a bit off track the past couple weeks  Has not eaten more than normal- but has had more fried food or skipped meals  Says he has had more sweets cravings as well    Plan:   Get back on track with planning meals for week- having protein throughout the day  Bring protein shakes and protein snack options to vacation this upcoming month to prepare  Keep sugar-free options available or drink half of protein drink to kill sweet craving  Increase exercise per CAROLYNN Blanca note on 10/10/24

## 2024-10-10 NOTE — PROGRESS NOTES
A nonsurgical medically supervised weight loss visit was conducted today.  Patient's weight is 409.5.  A body composition was conducted and patient was educated. He has lost 2.5 lbs. Since his visit last month. He reports that he did go to a convention for work where he walked a lot. Also, his mom passed away. The stress of this event did cause him to comfort eat/drink a doreen. But, nothing that was uncontrolled behavior. He admits that he is back on track and going forward. He was educated about life stressors and triggers to want to eat and drink for comfort. Patient understood. See weight loss plan.     PLAN:  - Patient will continue with increasing his walking.  - Patient will practice alternative behaviors to eating (handout given)  - Patient will follow dietary advice from VAN Tompkins RD.    It is my professional opinion that patient is in the action stage of behavior change.    AMIE Gilbert, CPT, CHC

## 2024-11-18 ENCOUNTER — CLINICAL SUPPORT (OUTPATIENT)
Dept: BARIATRICS | Facility: HOSPITAL | Age: 59
End: 2024-11-18

## 2024-11-18 DIAGNOSIS — E66.9 OBESITY: Primary | ICD-10-CM

## 2024-11-18 DIAGNOSIS — E66.01 CLASS 3 SEVERE OBESITY WITHOUT SERIOUS COMORBIDITY WITH BODY MASS INDEX (BMI) OF 50.0 TO 59.9 IN ADULT, UNSPECIFIED OBESITY TYPE: Primary | ICD-10-CM

## 2024-11-18 DIAGNOSIS — E66.813 CLASS 3 SEVERE OBESITY WITHOUT SERIOUS COMORBIDITY WITH BODY MASS INDEX (BMI) OF 50.0 TO 59.9 IN ADULT, UNSPECIFIED OBESITY TYPE: Primary | ICD-10-CM

## 2024-11-18 PROCEDURE — 94200023 HC BARIATRIC CONSULT - 60 MINS

## 2024-11-18 NOTE — PROGRESS NOTES
NON-SURGICAL WEIGHT LOSS FOLLOW UP    Height:   Ht Readings from Last 1 Encounters:   05/01/24 6' (1.829 m)      Weight:   Wt Readings from Last 3 Encounters:   10/10/24 1336 (!) 185.7 kg (409 lb 8 oz)   09/09/24 0805 (!) 185.4 kg (408 lb 12.8 oz)   07/08/24 0909 (!) 192.9 kg (425 lb 3.2 oz)      Weight loss since previous visit: lost 3.7#s (Pt's weight was 405#)    Changes in dietary patterns since previous visit:  Vacation for 1 week where drank alcohol and went out to eat more frequently  Performed more walking/movement than normal  Back on track last week- since back home from vacation  No more emotional eating this past month- does still have food noise during day    Plan: goal wt 250 lbs  - or under 300#   Aim to eat 115-125 grams of protein daily  Add protein to meals- aim for 25-30 g of protein at meals  Continue increasing water intake  Continue eating 3 meals daily and 2 snacks with protein   Perform CAROLYNN pack for exercise regimen

## 2024-11-18 NOTE — PROGRESS NOTES
A nonsurgical medically supervised weight loss visit was conducted today.  Patient's weight is 405.8 lbs. He has lost 3.7 lbs. Since his last visit. A total of 58 lbs. Has been lost. He is walking for exercise.  It was advised that patient start some strength training exercises.      PLAN:  - Patient will do [employer] videos 2x/week.  - Patient will follow dietary advice from VAN Tompkins RD.    It is my professional opinion that patient is in the action stage of behavior change.    AMIE Gilbert, CPT, CHC

## 2024-12-16 ENCOUNTER — CLINICAL SUPPORT (OUTPATIENT)
Dept: BARIATRICS | Facility: HOSPITAL | Age: 59
End: 2024-12-16

## 2024-12-16 VITALS — BODY MASS INDEX: 54.7 KG/M2 | WEIGHT: 315 LBS

## 2024-12-16 DIAGNOSIS — E66.01 CLASS 3 SEVERE OBESITY WITHOUT SERIOUS COMORBIDITY WITH BODY MASS INDEX (BMI) OF 50.0 TO 59.9 IN ADULT, UNSPECIFIED OBESITY TYPE: Primary | ICD-10-CM

## 2024-12-16 DIAGNOSIS — E66.9 OBESITY: Primary | ICD-10-CM

## 2024-12-16 DIAGNOSIS — E66.813 CLASS 3 SEVERE OBESITY WITHOUT SERIOUS COMORBIDITY WITH BODY MASS INDEX (BMI) OF 50.0 TO 59.9 IN ADULT, UNSPECIFIED OBESITY TYPE: Primary | ICD-10-CM

## 2024-12-16 PROCEDURE — 94200023 HC BARIATRIC CONSULT - 60 MINS

## 2024-12-16 NOTE — PROGRESS NOTES
A nonsurgical medically supervised weight loss visit was conducted today.  Patient's weight is 403 lbs. He has been walking around campus at work. He admits that the holidays are challenging. A body composition was conducted     PLAN:  - Patient will continue with current exercise regimen of walking on campus.  - Patient will follow dietary advice from VAN Tompkins RD.    It is my professional opinion that patient is in the action stage of behavior change.    AMIE Gilbert, CPT, CHC

## 2024-12-16 NOTE — PROGRESS NOTES
NON-SURGICAL WEIGHT LOSS FOLLOW UP    Height:   Ht Readings from Last 1 Encounters:   05/01/24 6' (1.829 m)      Weight:   Wt Readings from Last 3 Encounters:   12/16/24 0953 (!) 182.9 kg (403 lb 4.8 oz)   10/10/24 1336 (!) 185.7 kg (409 lb 8 oz)   09/09/24 0805 (!) 185.4 kg (408 lb 12.8 oz)      Weight loss since previous visit: lost 3#s    Changes in dietary patterns since previous visit:  Pt has been more inconsistent with meal times and meal compositions  Been out of town more with work and holidays  Has not planned out meals as well   Noticed has been eating more starches daily than regular schedule (ex: gumbo every day last week)  Has still been walking more - says he will start doing weight training soon to help with caloric burn    Plan: goal wt 250 lbs  - or under 300#   Last couple weeks of December- aim to have regular eating schedule of 3 meals daily with protein and high fiber  January get back to meal prepping and measuring out food portions  Drink 4 bottles of water daily

## 2025-01-17 ENCOUNTER — CLINICAL SUPPORT (OUTPATIENT)
Dept: BARIATRICS | Facility: HOSPITAL | Age: 60
End: 2025-01-17

## 2025-01-17 VITALS — BODY MASS INDEX: 54.66 KG/M2 | WEIGHT: 315 LBS

## 2025-01-17 DIAGNOSIS — E66.9 OBESITY: Primary | ICD-10-CM

## 2025-01-17 DIAGNOSIS — E66.01 CLASS 3 SEVERE OBESITY WITHOUT SERIOUS COMORBIDITY WITH BODY MASS INDEX (BMI) OF 50.0 TO 59.9 IN ADULT, UNSPECIFIED OBESITY TYPE: Primary | ICD-10-CM

## 2025-01-17 DIAGNOSIS — E66.813 CLASS 3 SEVERE OBESITY WITHOUT SERIOUS COMORBIDITY WITH BODY MASS INDEX (BMI) OF 50.0 TO 59.9 IN ADULT, UNSPECIFIED OBESITY TYPE: Primary | ICD-10-CM

## 2025-01-17 PROCEDURE — 94200023 HC BARIATRIC CONSULT - 60 MINS

## 2025-01-17 NOTE — PROGRESS NOTES
A nonsurgical medically supervised weight loss visit was conducted today.  Patient's weight is 403 lbs. He has not lost any weight since last month. He did get off track with the holidays and says he is getting back on track.       PLAN:  - Patient will continue with current exercise regimen.  - Patient will practice handling cravings and mindful eating behaviors.     It is my professional opinion that patient is in the action stage of behavior change.    AMIE Gilbert, CPT, CHC

## 2025-01-17 NOTE — PROGRESS NOTES
NON-SURGICAL WEIGHT LOSS FOLLOW UP    Height:   Ht Readings from Last 1 Encounters:   05/01/24 6' (1.829 m)      Weight:   Wt Readings from Last 3 Encounters:   01/17/25 0911 (!) 182.8 kg (403 lb)   12/16/24 0953 (!) 182.9 kg (403 lb 4.8 oz)   10/10/24 1336 (!) 185.7 kg (409 lb 8 oz)      Weight loss since previous visit: -0#s maintained    Changes in dietary patterns since previous visit:  Off for 2 weeks- not as active and off normal eating routine  More gumbo and rice  Less structure during day    Plan: goal wt 250 lbs  - or under 300#   Limit starches to once daily  Have only 1/2 cup serving size of starch daily  Drink at least 80 oz of water daily  Get back on normal eating regimen

## 2025-02-12 PROBLEM — R73.03 PREDIABETES: Status: ACTIVE | Noted: 2022-11-26

## 2025-02-17 ENCOUNTER — CLINICAL SUPPORT (OUTPATIENT)
Dept: BARIATRICS | Facility: HOSPITAL | Age: 60
End: 2025-02-17

## 2025-02-17 VITALS — BODY MASS INDEX: 54.47 KG/M2 | WEIGHT: 315 LBS

## 2025-02-17 DIAGNOSIS — E66.9 OBESITY: Primary | ICD-10-CM

## 2025-02-17 DIAGNOSIS — E66.01 CLASS 3 SEVERE OBESITY WITHOUT SERIOUS COMORBIDITY WITH BODY MASS INDEX (BMI) OF 50.0 TO 59.9 IN ADULT, UNSPECIFIED OBESITY TYPE: Primary | ICD-10-CM

## 2025-02-17 DIAGNOSIS — E66.813 CLASS 3 SEVERE OBESITY WITHOUT SERIOUS COMORBIDITY WITH BODY MASS INDEX (BMI) OF 50.0 TO 59.9 IN ADULT, UNSPECIFIED OBESITY TYPE: Primary | ICD-10-CM

## 2025-02-17 PROCEDURE — 94200023 HC BARIATRIC CONSULT - 60 MINS

## 2025-02-17 NOTE — PROGRESS NOTES
A nonsurgical medically supervised weight loss visit was conducted today.  Patient's weight is 401.6 lbs.. He has lost 2 lbs. Since last month, 63 lbs. Total. He is walking at work on his lunch break and increasing his ADL's. Once it gets key, he would like to start walking at the park.      PLAN:  - Patient will continue with current exercise regimen.  - Patient will follow dietary advice from VAN Tompkins RD..    It is my professional opinion that patient is in the action stage of behavior change.    AMIE Gilbert, CPT, CHC

## 2025-02-17 NOTE — PROGRESS NOTES
NON-SURGICAL WEIGHT LOSS FOLLOW UP    Height:   Ht Readings from Last 1 Encounters:   02/13/25 6' (1.829 m)      Weight:   Wt Readings from Last 3 Encounters:   02/17/25 0822 (!) 182.2 kg (401 lb 9.6 oz)   02/13/25 1122 (!) 181.9 kg (401 lb)   01/17/25 0911 (!) 182.8 kg (403 lb)      Weight loss since previous visit: 2#s     Changes in dietary patterns since previous visit:  Weekdays better eating schedule but admits to eating more rice this past month  Less activity and ate worse over week off for snow snorm  Eating out at restaurants more this past month- ex [facility] [facility] [employer] with bread rolls   More activity on weekends recently  Has gotten out of habit of measuring out foods like starches  Still picking high protein snacks between meals    Plan: wants to lose 30#s in 4 months  Remove cereal/eating CHOs right before going to bed  Increase activity level per CAROLYNN Blanca recommendations  Get back to measuring starches during the week- 1/2 cup serving size  If purchasing pre-made meal make sure 30 grams of carbs or less

## 2025-03-18 NOTE — PROGRESS NOTES
Moira Vick    No chief complaint on file.    HPI: Mr. Moira Vick is a 58 y.o. male in clinic today for non surgical weight loss. Pt here to discuss weight management and possibly weight loss medication.     Pt very excited about his weight over the last year, I have not seen him since May of 2024 but he has continued to see the [facility] team and managed to lost 65lbs. The last two months he has stalled in weight and would like to discuss weight loss drugs to increase this. Denies much food noise but does have cravings occasionally.      Pt is NOT interested in weight loss surgery      Labs (2/13/25): A1c: 5.5%, all other labs WNLs  Labs (12/18/23): A1c: 5.7%, all other labs WNLs     HT:   Weights  NSWL Consult (04/02/2024): 463#                BMI: 62  NSWL 5/1/24: 446#                                        BMI: 60.4  NSWL 3/18/25: 399#    BMI: 54     Pertinent History:  HTN - [x] Yes   [] No  HLD - [x] Yes   [] No  DM  -  [] Yes   [x] No, pre DM. A1c: 5.7%  ELI - [x] Yes   [] No  GERD - [] Yes   [x] No  Anticoagulation- [] Yes   [x] No    Patient Care Team:  Ashok Anguiano MD as PCP - General (Family Medicine)     Subjective:     12 point review of systems conducted, negative except as stated in the history of present illness. See HPI for details.    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 Omalley    CYST REMOVAL Right     FOOT    FOOT SURGERY      X2    TONSILLECTOMY AND ADENOIDECTOMY       Family History   Problem Relation Name Age of Onset     Arthritis Mother      Diabetes type II Mother      Breast cancer Mother      COPD Father      Neuropathy Father      Peripheral vascular disease Father      Drug abuse Sister       Social History[1]   Current Outpatient Medications   Medication Instructions    atorvastatin (LIPITOR) 20 mg, Oral, Daily    fluticasone propionate (FLONASE) 50 mcg, Each Nostril, Daily    Lactobacillus rhamnosus GG (CULTURELLE) 10 billion cell capsule 1 capsule, Daily    losartan-hydrochlorothiazide 100-25 mg (HYZAAR) 100-25 mg per tablet 1 tablet, Oral, Daily       Objective:     Vital Signs (Most Recent):  Visit Vitals  /80   Pulse 80   Ht 6' (1.829 m)   Wt (!) 181 kg (399 lb)   BMI 54.11 kg/m²       Physical Exam:  General:  Alert and oriented.    Respiratory:  Lungs are clear to auscultation, Respirations are non-labored, Breath sounds are equal.    Cardiovascular:  Normal rate, Regular rhythm, No murmur.    Gastrointestinal:  Soft, Non-tender, Non-distended, Normal bowel sounds        Musculoskeletal:  Normal range of motion, Normal strength.    Integumentary:  Warm, Dry, Pink.    Neurologic:  Alert, Oriented.    Psychiatric:  Cooperative.      Assessment:         ICD-10-CM ICD-9-CM   1. Encounter for weight loss counseling  Z71.3 V65.3   2. Dietary counseling  Z71.3 V65.3   3. Exercise counseling  Z71.82 V65.41   4. Morbid obesity  E66.01 278.01        Plan:     1. Encounter for weight loss counseling    2. Dietary counseling    3. Exercise counseling    4. Morbid obesity  Overview:  Encourage weight loss, diet and exercise           Plan:  Awesome job with weight loss and making changes to dietary  Pt NEEDS to begin dedicated exercise regimen to continue to see weight loss. Verbalized understanding.   Discussed importance of strict dietary compliance as recommended by dietitian. Limit processed foods and starchy CHOs. Eliminate high calorie liquids. Increased water intake. Practice mindful eating patterns. Recommend food  journaling for accountability.  Continue green heart meals   Discussed types of weight loss medications,  Adipex 37.5mg tab  1/2 tab for 7 days and then full tab thereafter.   #30  NR   - Discussed side effects with patient such as increase in blood pressure, high heart rate, insomnia and need for discontinuation such as BP greater than 150/90, pregnancy, palpitations that are uncontrollable or side effects that pt is unable to tolerate, etc.   Discussed plan of care with medical bariatrician Dr. Zack Bowling and he was in agreement with proposed treatment plan.   Keep routine scheduled appointments with PCP/specialists.   FU 1 month         [1]   Social History  Tobacco Use    Smoking status: Former     Current packs/day: 0.00     Average packs/day: 0.5 packs/day for 2.0 years (1.0 ttl pk-yrs)     Types: Cigarettes, Cigars     Start date:      Quit date: 2018     Years since quittin.2    Smokeless tobacco: Never   Substance Use Topics    Alcohol use: Not Currently     Comment: rare    Drug use: Never

## 2025-03-19 ENCOUNTER — CLINICAL SUPPORT (OUTPATIENT)
Dept: BARIATRICS | Facility: HOSPITAL | Age: 60
End: 2025-03-19

## 2025-03-19 ENCOUNTER — OFFICE VISIT (OUTPATIENT)
Dept: SURGERY | Facility: CLINIC | Age: 60
End: 2025-03-19

## 2025-03-19 VITALS
HEIGHT: 72 IN | HEIGHT: 72 IN | WEIGHT: 315 LBS | WEIGHT: 315 LBS | DIASTOLIC BLOOD PRESSURE: 80 MMHG | SYSTOLIC BLOOD PRESSURE: 132 MMHG | BODY MASS INDEX: 42.66 KG/M2 | BODY MASS INDEX: 54.11 KG/M2 | BODY MASS INDEX: 42.66 KG/M2 | WEIGHT: 315 LBS | HEART RATE: 80 BPM

## 2025-03-19 DIAGNOSIS — E66.813 CLASS 3 SEVERE OBESITY WITHOUT SERIOUS COMORBIDITY WITH BODY MASS INDEX (BMI) OF 50.0 TO 59.9 IN ADULT, UNSPECIFIED OBESITY TYPE: Primary | ICD-10-CM

## 2025-03-19 DIAGNOSIS — E66.01 MORBID OBESITY: ICD-10-CM

## 2025-03-19 DIAGNOSIS — E66.01 CLASS 3 SEVERE OBESITY WITHOUT SERIOUS COMORBIDITY WITH BODY MASS INDEX (BMI) OF 50.0 TO 59.9 IN ADULT, UNSPECIFIED OBESITY TYPE: Primary | ICD-10-CM

## 2025-03-19 DIAGNOSIS — Z71.82 EXERCISE COUNSELING: ICD-10-CM

## 2025-03-19 DIAGNOSIS — Z71.3 DIETARY COUNSELING: ICD-10-CM

## 2025-03-19 DIAGNOSIS — E66.9 OBESITY: Primary | ICD-10-CM

## 2025-03-19 DIAGNOSIS — Z71.3 ENCOUNTER FOR WEIGHT LOSS COUNSELING: Primary | ICD-10-CM

## 2025-03-19 PROCEDURE — 94200023 HC BARIATRIC CONSULT - 60 MINS: Performed by: DIETITIAN, REGISTERED

## 2025-03-19 RX ORDER — PHENTERMINE HYDROCHLORIDE 37.5 MG/1
TABLET ORAL
Qty: 30 TABLET | Refills: 0 | Status: CANCELLED | OUTPATIENT
Start: 2025-03-19

## 2025-03-19 NOTE — PROGRESS NOTES
"  A nonsurgical medically supervised weight loss visit was conducted today.  Patient's weight is 399 lbs. He has lost 3 lbs. Since last month. Since he has started the program, he has lost 65 lbs. He continues to walk more around his job campus. He doesn't have any specific cravings. He is eating healthier and food prepping. He is aware of "food noise." See weight loss plan and body comp. He would like to start walking on a nearby track.      PLAN:  - Patient will continue with current walking on his job campus and will start walking on the track 3x/week and strengthening exercises 2x/week.   - Patient will follow dietary advice from bariatric dietician, Phuong Santana.    It is my professional opinion that patient is in the action stage of behavior change.    Mili Blanca, AMIE, CPT, CHC   "

## 2025-03-19 NOTE — PROGRESS NOTES
NON-SURGICAL WEIGHT LOSS FOLLOW UP    Height:   Ht Readings from Last 1 Encounters:   03/19/25 6' (1.829 m)      Weight:   Wt Readings from Last 3 Encounters:   03/19/25 0956 (!) 181 kg (399 lb)   03/19/25 0928 (!) 181 kg (399 lb)   03/19/25 0906 (!) 181 kg (399 lb)      Weight loss since previous visit: -3#    Changes in dietary patterns since previous visit:  Pt states he is moving more during day, but needs to increase programmed exercise.  Pt states he has had more opportunities to eat rice (low glycemic [place]), but is eating earlier in the evening, 6-6:30pm. He does get a craving later for a sweet and he'll dip a chocolate in peanut butter.     Discussed the high caloric density of peanut butter and advised reducing this. Pt isn't measuring starchy CHOs, but trying to reduce frequency.     Plan:    Add a high protein, low sugar yogurt or SF pudding with powdered PB as evening snack in place of chocolate candy.  Measure all starchy CHOs to 1/4 dinner plate or 1/2 c for meals. Add more non-starchy fibrous vegetables to meal.  Exercise according to CAROLYNN Blanca's recommendations.

## 2025-04-14 ENCOUNTER — TELEPHONE (OUTPATIENT)
Dept: PHARMACY | Facility: CLINIC | Age: 60
End: 2025-04-14
Payer: COMMERCIAL

## 2025-04-14 NOTE — TELEPHONE ENCOUNTER
[facility]/Population Health Chart Review & Patient Outreach Details For Medication Adherence Project    Reason for Outreach Encounter: 3rd Party payor non-compliance report ([employer], [employer], [employer], etc)  2.  Patient Outreach Method: Reviewed patient chart   3.   Medication in question:    Hyperlipidemia Medications              atorvastatin (LIPITOR) 20 MG tablet Take 1 tablet (20 mg total) by mouth once daily.                  LF 90 ds 2/11/25    4.  Reviewed and or Updates Made To: Patient Chart  5. Outreach Outcomes and/or actions taken: Patient filled medication and is on track to be adherent  Additional Notes:

## 2025-04-22 NOTE — PROGRESS NOTES
"Moira Vick    No chief complaint on file.    HPI: Mr. Moira Vick is a 58 y.o. male in clinic today for non surgical weight loss. Pt here to discuss weight management and possibly weight loss medication.      Current wt loss meds: Adipex 37.5mg tab  Side effects: felt "weird" initially but otherwise ok. Lost 70lbs without meds. Had a stall and wanted to try something new.     Pt is NOT interested in weight loss surgery      Labs (2/13/25): A1c: 5.5%, all other labs WNLs  Labs (12/18/23): A1c: 5.7%, all other labs WNLs     HT:   Weights  NSWL Consult (04/02/2024): 463#                BMI: 62  NS[facility] 5/1/24: 446#                                        BMI: 60.4  NSWL 3/18/25: 399#                                      BMI: 543  NSWL 4/23/25: 394#    BMI: 53     Pertinent History:  HTN - [x] Yes   [] No  HLD - [x] Yes   [] No  DM  -  [] Yes   [x] No, pre DM. A1c: 5.7%  ELI - [x] Yes   [] No  GERD - [] Yes   [x] No  Anticoagulation- [] Yes   [x] No    Patient Care Team:  Ashok Anguiano MD as PCP - General (Family Medicine)     Subjective:     12 point review of systems conducted, negative except as stated in the history of present illness. See HPI for details.    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 Omalley    CYST REMOVAL Right     FOOT    FOOT SURGERY      X2    TONSILLECTOMY AND ADENOIDECTOMY       Family History   Problem Relation Name Age of Onset    Arthritis Mother      Diabetes type II Mother      Breast cancer Mother      COPD Father      " Neuropathy Father      Peripheral vascular disease Father      Drug abuse Sister       Social History[1]   Current Outpatient Medications   Medication Instructions    atorvastatin (LIPITOR) 20 mg, Oral, Daily    fluticasone propionate (FLONASE) 50 mcg, Each Nostril, Daily    Lactobacillus rhamnosus GG (CULTURELLE) 10 billion cell capsule 1 capsule, Daily    losartan-hydrochlorothiazide 100-25 mg (HYZAAR) 100-25 mg per tablet 1 tablet, Oral, Daily    phentermine (ADIPEX-P) 37.5 mg, Daily       Objective:     Vital Signs (Most Recent):  Visit Vitals  /80 (BP Location: Left arm, Patient Position: Sitting)   Pulse 60   Ht 6' (1.829 m)   Wt (!) 178.8 kg (394 lb 1.6 oz)   BMI 53.45 kg/m²       Physical Exam:  General:  Alert and oriented.    Respiratory:  Lungs are clear to auscultation, Respirations are non-labored, Breath sounds are equal.    Cardiovascular:  Normal rate, Regular rhythm, No murmur.    Gastrointestinal:  Soft, Non-tender, Non-distended, Normal bowel sounds        Musculoskeletal:  Normal range of motion, Normal strength.    Integumentary:  Warm, Dry, Pink.    Neurologic:  Alert, Oriented.    Psychiatric:  Cooperative.      Assessment:         ICD-10-CM ICD-9-CM   1. Encounter for weight loss counseling  Z71.3 V65.3   2. Dietary counseling  Z71.3 V65.3   3. Exercise counseling  Z71.82 V65.41   4. Obesity, unspecified class, unspecified obesity type, unspecified whether serious comorbidity present  E66.9 278.00        Plan:     1. Encounter for weight loss counseling    2. Dietary counseling    3. Exercise counseling    4. Obesity, unspecified class, unspecified obesity type, unspecified whether serious comorbidity present           Plan:  Awesome job with weight loss and making changes to dietary  Continue to work on dedicated exercise regimen   Discussed importance of strict dietary compliance as recommended by dietitian. Limit processed foods and starchy CHOs. Eliminate high calorie liquids.  Increased water intake. Practice mindful eating patterns. Recommend food journaling for accountability.  Continue green heart meals   Discussed types of weight loss medications,  Adipex 37.5mg tab (mth 2)  1 tab qd  #30  NR   - Discussed side effects with patient such as increase in blood pressure, high heart rate, insomnia and need for discontinuation such as BP greater than 150/90, pregnancy, palpitations that are uncontrollable or side effects that pt is unable to tolerate, etc.   Discussed plan of care with medical bariatrician Dr. Zack Bowling and he was in agreement with proposed treatment plan.   Keep routine scheduled appointments with PCP/specialists.   FU 1 month         [1]   Social History  Tobacco Use    Smoking status: Former     Current packs/day: 0.00     Average packs/day: 0.5 packs/day for 2.0 years (1.0 ttl pk-yrs)     Types: Cigarettes, Cigars     Start date:      Quit date: 2018     Years since quittin.3    Smokeless tobacco: Never   Substance Use Topics    Alcohol use: Not Currently     Comment: rare    Drug use: Never

## 2025-04-23 ENCOUNTER — OFFICE VISIT (OUTPATIENT)
Dept: SURGERY | Facility: CLINIC | Age: 60
End: 2025-04-23

## 2025-04-23 ENCOUNTER — CLINICAL SUPPORT (OUTPATIENT)
Dept: BARIATRICS | Facility: HOSPITAL | Age: 60
End: 2025-04-23

## 2025-04-23 VITALS
WEIGHT: 315 LBS | HEART RATE: 60 BPM | SYSTOLIC BLOOD PRESSURE: 132 MMHG | HEIGHT: 72 IN | BODY MASS INDEX: 42.66 KG/M2 | WEIGHT: 315 LBS | BODY MASS INDEX: 53.45 KG/M2 | DIASTOLIC BLOOD PRESSURE: 80 MMHG

## 2025-04-23 DIAGNOSIS — E66.9 OBESITY: Primary | ICD-10-CM

## 2025-04-23 DIAGNOSIS — Z71.3 DIETARY COUNSELING: ICD-10-CM

## 2025-04-23 DIAGNOSIS — Z71.3 ENCOUNTER FOR WEIGHT LOSS COUNSELING: Primary | ICD-10-CM

## 2025-04-23 DIAGNOSIS — E66.01 MORBID OBESITY WITH BMI OF 50.0-59.9, ADULT: Primary | ICD-10-CM

## 2025-04-23 DIAGNOSIS — E66.9 OBESITY, UNSPECIFIED CLASS, UNSPECIFIED OBESITY TYPE, UNSPECIFIED WHETHER SERIOUS COMORBIDITY PRESENT: ICD-10-CM

## 2025-04-23 DIAGNOSIS — Z71.82 EXERCISE COUNSELING: ICD-10-CM

## 2025-04-23 DIAGNOSIS — E66.9 OBESITY, UNSPECIFIED CLASS, UNSPECIFIED OBESITY TYPE, UNSPECIFIED WHETHER SERIOUS COMORBIDITY PRESENT: Primary | ICD-10-CM

## 2025-04-23 PROCEDURE — 99214 OFFICE O/P EST MOD 30 MIN: CPT | Mod: ,,, | Performed by: NURSE PRACTITIONER

## 2025-04-23 PROCEDURE — 94200023 HC BARIATRIC CONSULT - 60 MINS

## 2025-04-23 RX ORDER — PHENTERMINE HYDROCHLORIDE 37.5 MG/1
37.5 TABLET ORAL DAILY
COMMUNITY
Start: 2025-03-20 | End: 2025-04-23 | Stop reason: SDUPTHER

## 2025-04-23 NOTE — PROGRESS NOTES
NON-SURGICAL WEIGHT LOSS FOLLOW UP    Height:   Ht Readings from Last 1 Encounters:   03/19/25 6' (1.829 m)      Weight:   Wt Readings from Last 3 Encounters:   04/23/25 1021 (!) 178.8 kg (394 lb 1.6 oz)   03/19/25 0956 (!) 181 kg (399 lb)   03/19/25 0928 (!) 181 kg (399 lb)      Weight loss since previous visit: lost 5# (70#s total since he joined program 1 year ago)    Changes in dietary patterns since previous visit:  Does not notice food noise lessened since starting medication (only taking 1/2 pill daily)  More erratic week schedule wise with work- caused eating schedule to be inconsistent  Prioritizing protein at meals and increasing activity overall this past month  Main struggle is starches and serving sizes    Plan: lose 22#s in the next 11 weeks before vacation  Measure out starches with 1/2 cup measuring cup - Limit to one serving each meal  Increase water intake - aim for 4-6 16.9 oz bottles daily  Continue prioritizing protein at meals and snacks

## 2025-04-23 NOTE — PROGRESS NOTES
A nonsurgical medically supervised weight loss visit was conducted today.  Patient's weight is 394 lbs. He has lost 5 lbs. (70 lbs. Since April 2024) since his visit last month.  A body composition was conducted and patient was educated on results. He continues to walk around his work campus. He still hasn't walked on the track like anticipated. He admits that he has had some work stress, not not stress eating. No issues or concerns. See weight loss plan and body comp.      PLAN:  - Patient will continue with current exercise regimen.Start chair exercises for body weight strengthening.  - Patient will follow dietary advice from bariatric dietician, VAN Tompkins RD.     It is my professional opinion that patient is in the preparation stage of behavior change.    AMIE Gilbert, CPT, CHC

## 2025-04-24 RX ORDER — PHENTERMINE HYDROCHLORIDE 37.5 MG/1
37.5 TABLET ORAL DAILY
Qty: 30 TABLET | Refills: 0 | Status: SHIPPED | OUTPATIENT
Start: 2025-04-24

## 2025-05-07 ENCOUNTER — TELEPHONE (OUTPATIENT)
Dept: PHARMACY | Facility: CLINIC | Age: 60
End: 2025-05-07
Payer: COMMERCIAL

## 2025-05-08 NOTE — TELEPHONE ENCOUNTER
[facility]/Population Health Chart Review & Patient Outreach Details For Medication Adherence Project    Reason for Outreach Encounter: 3rd Party payor non-compliance report ([employer], [employer], C, etc)  2.  Patient Outreach Method: Reviewed patient chart   3.   Medication in question:    Hyperlipidemia Medications              atorvastatin (LIPITOR) 20 MG tablet Take 1 tablet (20 mg total) by mouth once daily.                  atorvastatin  last filled  5/7/25 for 90 day supply      4.  Reviewed and or Updates Made To: Patient Chart  5. Outreach Outcomes and/or actions taken: Patient filled medication and is on track to be adherent  Additional Notes:

## 2025-05-21 ENCOUNTER — CLINICAL SUPPORT (OUTPATIENT)
Dept: BARIATRICS | Facility: HOSPITAL | Age: 60
End: 2025-05-21

## 2025-05-21 ENCOUNTER — OFFICE VISIT (OUTPATIENT)
Dept: SURGERY | Facility: CLINIC | Age: 60
End: 2025-05-21

## 2025-05-21 VITALS — WEIGHT: 315 LBS | BODY MASS INDEX: 53.44 KG/M2

## 2025-05-21 VITALS
SYSTOLIC BLOOD PRESSURE: 130 MMHG | BODY MASS INDEX: 42.66 KG/M2 | HEIGHT: 72 IN | WEIGHT: 315 LBS | HEART RATE: 60 BPM | DIASTOLIC BLOOD PRESSURE: 84 MMHG

## 2025-05-21 DIAGNOSIS — E66.01 MORBID OBESITY WITH BMI OF 50.0-59.9, ADULT: Primary | ICD-10-CM

## 2025-05-21 DIAGNOSIS — Z71.3 DIETARY COUNSELING: ICD-10-CM

## 2025-05-21 DIAGNOSIS — E66.9 OBESITY, UNSPECIFIED CLASS, UNSPECIFIED OBESITY TYPE, UNSPECIFIED WHETHER SERIOUS COMORBIDITY PRESENT: ICD-10-CM

## 2025-05-21 DIAGNOSIS — Z71.3 ENCOUNTER FOR WEIGHT LOSS COUNSELING: Primary | ICD-10-CM

## 2025-05-21 DIAGNOSIS — Z71.82 EXERCISE COUNSELING: ICD-10-CM

## 2025-05-21 DIAGNOSIS — E66.9 OBESITY: Primary | ICD-10-CM

## 2025-05-21 DIAGNOSIS — E66.01 MORBID OBESITY: ICD-10-CM

## 2025-05-21 PROCEDURE — 99214 OFFICE O/P EST MOD 30 MIN: CPT | Mod: ,,, | Performed by: NURSE PRACTITIONER

## 2025-05-21 PROCEDURE — 94200023 HC BARIATRIC CONSULT - 60 MINS

## 2025-05-21 RX ORDER — PHENTERMINE HYDROCHLORIDE 37.5 MG/1
37.5 TABLET ORAL DAILY
Qty: 30 TABLET | Refills: 0 | Status: SHIPPED | OUTPATIENT
Start: 2025-05-21

## 2025-05-21 NOTE — PROGRESS NOTES
A surgical medically supervised weight loss visit was conducted today.  Patient's weight is 6'0.  He has lost 0 lbs. A body composition was conducted and patient was educated on results. Not many changes since last appointment. .      PLAN:  - Patient will continue will walk around campus and track.  - Patient will follow dietary advice from bariatric dietician, VAN Tompkins RD.    It is my professional opinion that patient is in the contemplation stage of behavior change.    AMIE Gilbert, CPT, CHC

## 2025-05-21 NOTE — PROGRESS NOTES
Moira Vick    No chief complaint on file.    HPI: Mr. Moira Vick is a 58 y.o. male in clinic today for non surgical weight loss. Pt here to discuss weight management and weight loss medication.      Current wt loss meds: Adipex 37.5mg tab, only 1/2 tab still at this time.   Side effects: having some mild GI issues but nothing severe.      Pt is NOT interested in weight loss surgery      Labs (2/13/25): A1c: 5.5%, all other labs WNLs  Labs (12/18/23): A1c: 5.7%, all other labs WNLs     HT:   Weights  NS[facility] Consult (04/02/2024): 463#                BMI: 62  NS[facility] 5/1/24: 446#                                        BMI: 60.4  NSWL 3/18/25: 399#                                      BMI: 543  NSWL 4/23/25: 394#                                      BMI: 53  NSWL 5/21/25: 394#    BMI: 53     Pertinent History:  HTN - [x] Yes   [] No  HLD - [x] Yes   [] No  DM  -  [] Yes   [x] No, pre DM. A1c: 5.7%  ELI - [x] Yes   [] No  GERD - [] Yes   [x] No  Anticoagulation- [] Yes   [x] No    Patient Care Team:  Ashok Anguiano MD as PCP - General (Family Medicine)     Subjective:     12 point review of systems conducted, negative except as stated in the history of present illness. See HPI for details.    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 Omalley    CYST REMOVAL Right     FOOT    FOOT SURGERY      X2    TONSILLECTOMY AND ADENOIDECTOMY       Family History   Problem Relation Name Age of Onset    Arthritis Mother      Diabetes type II Mother      Breast cancer  Mother      COPD Father      Neuropathy Father      Peripheral vascular disease Father      Drug abuse Sister       Social History[1]   Current Outpatient Medications   Medication Instructions    atorvastatin (LIPITOR) 20 mg, Oral, Daily    fluticasone propionate (FLONASE) 50 mcg, Each Nostril, Daily    Lactobacillus rhamnosus GG (CULTURELLE) 10 billion cell capsule 1 capsule, Daily    losartan-hydrochlorothiazide 100-25 mg (HYZAAR) 100-25 mg per tablet 1 tablet, Oral, Daily    phentermine (ADIPEX-P) 37.5 mg, Oral, Daily       Objective:     Vital Signs (Most Recent):  Visit Vitals  /84 (BP Location: Left arm, Patient Position: Sitting)   Pulse 60   Ht 6' (1.829 m)   Wt (!) 178.8 kg (394 lb 3.2 oz)   BMI 53.46 kg/m²       Physical Exam:  General:  Alert and oriented.    Respiratory:  Lungs are clear to auscultation, Respirations are non-labored, Breath sounds are equal.    Cardiovascular:  Normal rate, Regular rhythm, No murmur.    Gastrointestinal:  Soft, Non-tender, Non-distended, Normal bowel sounds        Musculoskeletal:  Normal range of motion, Normal strength.    Integumentary:  Warm, Dry, Pink.    Neurologic:  Alert, Oriented.    Psychiatric:  Cooperative.      Assessment:         ICD-10-CM ICD-9-CM   1. Encounter for weight loss counseling  Z71.3 V65.3   2. Dietary counseling  Z71.3 V65.3   3. Exercise counseling  Z71.82 V65.41   4. Morbid obesity  E66.01 278.01        Plan:     1. Encounter for weight loss counseling    2. Dietary counseling    3. Exercise counseling    4. Morbid obesity  Overview:  Encourage weight loss, diet and exercise             Plan:   Needs to start dedicated exercise regimen   Discussed importance of strict dietary compliance as recommended by dietitian. Limit processed foods and starchy CHOs. Eliminate high calorie liquids. Increased water intake. Practice mindful eating patterns. Recommend food journaling for accountability.  Continue green heart meals, watch carbs, eat  consistently   Discussed types of weight loss medications,  Adipex 37.5mg tab (mth 3)  1 tab qd  #30  NR   Discussed side effects with patient such as increase in blood pressure, high heart rate, insomnia and need for discontinuation such as BP greater than 150/90, pregnancy, palpitations that are uncontrollable or side effects that pt is unable to tolerate, etc.   Discussed plan of care with medical bariatrician Dr. Zack Bowling and he was in agreement with proposed treatment plan.   Keep routine scheduled appointments with PCP/specialists.   FU 1 month         [1]   Social History  Tobacco Use    Smoking status: Former     Current packs/day: 0.00     Average packs/day: 0.5 packs/day for 2.0 years (1.0 ttl pk-yrs)     Types: Cigarettes, Cigars     Start date:      Quit date: 2018     Years since quittin.3    Smokeless tobacco: Never   Substance Use Topics    Alcohol use: Not Currently     Comment: rare    Drug use: Never

## 2025-05-21 NOTE — PROGRESS NOTES
NON-SURGICAL WEIGHT LOSS FOLLOW UP    Height:   Ht Readings from Last 1 Encounters:   05/08/25 6' (1.829 m)      Weight:   Wt Readings from Last 3 Encounters:   05/21/25 0837 (!) 178.7 kg (394 lb)   05/08/25 1419 (!) 178.7 kg (394 lb)   04/23/25 1040 (!) 178.8 kg (394 lb 1.6 oz)      Weight loss since previous visit: -0#    Changes in dietary patterns since previous visit:  Pt struggling with burnout of same meals- discussed incorporating new recipes  Still may be overeating starches/fats- advised cutting out starch at dinner time  Had went out to eat/more crawfish boils with sauce this past month  Also drinking 1-2 sugary alcoholic beverages on weekends to tryout before vacation in July  Eating 3-4 protein supplements/bars daily- advised to replace 2-3 with whole foods instead (gave him ideas)  Still having ice cream cravings - suggested low katie ice cream bar to keep just in case as better alternative    Plan:   Replace supplements with more whole food options  Aim to eat 2 cups of vegetables and 2 cups of fruits daily  No starches at dinners - only lean meat and veggies  Try Halo Top bar if having sweets craving ( katie)  Try out new recipes to replace burnout

## 2025-06-25 ENCOUNTER — CLINICAL SUPPORT (OUTPATIENT)
Dept: BARIATRICS | Facility: HOSPITAL | Age: 60
End: 2025-06-25

## 2025-06-25 ENCOUNTER — OFFICE VISIT (OUTPATIENT)
Dept: SURGERY | Facility: CLINIC | Age: 60
End: 2025-06-25

## 2025-06-25 VITALS
SYSTOLIC BLOOD PRESSURE: 136 MMHG | BODY MASS INDEX: 42.66 KG/M2 | HEART RATE: 64 BPM | HEIGHT: 72 IN | WEIGHT: 315 LBS | DIASTOLIC BLOOD PRESSURE: 78 MMHG

## 2025-06-25 VITALS — WEIGHT: 315 LBS | BODY MASS INDEX: 53.33 KG/M2

## 2025-06-25 DIAGNOSIS — Z71.3 ENCOUNTER FOR WEIGHT LOSS COUNSELING: Primary | ICD-10-CM

## 2025-06-25 DIAGNOSIS — E66.9 OBESITY: Primary | ICD-10-CM

## 2025-06-25 DIAGNOSIS — E66.01 MORBID OBESITY: ICD-10-CM

## 2025-06-25 DIAGNOSIS — Z71.82 EXERCISE COUNSELING: ICD-10-CM

## 2025-06-25 DIAGNOSIS — Z71.3 DIETARY COUNSELING: ICD-10-CM

## 2025-06-25 DIAGNOSIS — E66.01 MORBID OBESITY WITH BMI OF 50.0-59.9, ADULT: Primary | ICD-10-CM

## 2025-06-25 PROCEDURE — 99214 OFFICE O/P EST MOD 30 MIN: CPT | Mod: ,,, | Performed by: NURSE PRACTITIONER

## 2025-06-25 PROCEDURE — 94200023 HC BARIATRIC CONSULT - 60 MINS

## 2025-06-25 NOTE — PROGRESS NOTES
Moira Vick    No chief complaint on file.    HPI: Mr. Moira Vick is a 58 y.o. male in clinic today for non surgical weight loss. Pt here to discuss weight management and weight loss medication.      Current wt loss meds: none. Discussing possibly zepbound   Side effects: none     Pt is NOT interested in weight loss surgery      Labs (2/13/25): A1c: 5.5%, all other labs WNLs  Labs (12/18/23): A1c: 5.7%, all other labs WNLs     HT: 73in  Weights  [facility] Consult (04/02/2024): 463#                BMI: 62  [facility] 5/1/24: 446#                                        BMI: 60.4  [facility] 3/18/25: 399#                                      BMI: 543  [facility] 4/23/25: 394#                                      BMI: 53  [facility] 5/21/25: 394#                                      BMI: 53  [facility] 6/25/25: 393#    BMI: 53     Pertinent History:  HTN - [x] Yes   [] No  HLD - [x] Yes   [] No  DM  -  [] Yes   [x] No, pre DM. A1c: 5.7%  ELI - [x] Yes   [] No  GERD - [] Yes   [x] No  Anticoagulation- [] Yes   [x] No    Patient Care Team:  Ashok Anguiano MD as PCP - General (Family Medicine)     Subjective:     12 point review of systems conducted, negative except as stated in the history of present illness. See HPI for details.    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 Omalley    CYST REMOVAL Right     FOOT    FOOT SURGERY      X2    TONSILLECTOMY AND ADENOIDECTOMY       Family History   Problem Relation Name Age of Onset    Arthritis Mother      Diabetes type II Mother      Breast  cancer Mother      COPD Father      Neuropathy Father      Peripheral vascular disease Father      Drug abuse Sister       Social History[1]   Current Outpatient Medications   Medication Instructions    atorvastatin (LIPITOR) 20 mg, Oral, Daily    fluticasone propionate (FLONASE) 50 mcg, Each Nostril, Daily    Lactobacillus rhamnosus GG (CULTURELLE) 10 billion cell capsule 1 capsule, Daily    losartan-hydrochlorothiazide 100-25 mg (HYZAAR) 100-25 mg per tablet 1 tablet, Oral, Daily    phentermine (ADIPEX-P) 37.5 mg, Oral, Daily       Objective:     Vital Signs (Most Recent):  Visit Vitals  /78 (BP Location: Left arm, Patient Position: Sitting)   Pulse 64   Ht 6' (1.829 m)   Wt (!) 178.4 kg (393 lb 3.2 oz)   BMI 53.33 kg/m²       Physical Exam:  General:  Alert and oriented.    Respiratory:  Lungs are clear to auscultation, Respirations are non-labored, Breath sounds are equal.    Cardiovascular:  Normal rate, Regular rhythm, No murmur.    Gastrointestinal:  Soft, Non-tender, Non-distended, Normal bowel sounds        Musculoskeletal:  Normal range of motion, Normal strength.    Integumentary:  Warm, Dry, Pink.    Neurologic:  Alert, Oriented.    Psychiatric:  Cooperative.      Assessment:         ICD-10-CM ICD-9-CM   1. Encounter for weight loss counseling  Z71.3 V65.3   2. Dietary counseling  Z71.3 V65.3   3. Exercise counseling  Z71.82 V65.41   4. Morbid obesity  E66.01 278.01        Plan:     1. Encounter for weight loss counseling    2. Dietary counseling    3. Exercise counseling    4. Morbid obesity  Overview:  Encourage weight loss, diet and exercise           Plan:  Needs to start dedicated exercise regimen   Discussed importance of strict dietary compliance as recommended by dietitian. Limit processed foods and starchy CHOs. Eliminate high calorie liquids. Increased water intake. Practice mindful eating patterns. Recommend food journaling for accountability.  Continue green heart meals, watch carbs,  eat consistently   Discussed types of weight loss medications,  None at this time. Discussed heavily zepbound due to his ELI and preventative nature of the peptide but pt still unsure about it at this time. Pt will continue to think on it.   Discussed plan of care with medical bariatrician Dr. Zack Bowling and he was in agreement with proposed treatment plan.   Keep routine scheduled appointments with PCP/specialists.   FU 1 month         [1]   Social History  Tobacco Use    Smoking status: Former     Current packs/day: 0.00     Average packs/day: 0.5 packs/day for 2.0 years (1.0 ttl pk-yrs)     Types: Cigarettes, Cigars     Start date:      Quit date: 2018     Years since quittin.4    Smokeless tobacco: Never   Substance Use Topics    Alcohol use: Not Currently     Comment: rare    Drug use: Never

## 2025-06-25 NOTE — PROGRESS NOTES
A NONsurgical medically supervised weight loss visit was conducted today.  Patient's weight is 393 LBS. He has lost 1 lb. Since last month. A body composition was conducted and patient was educated on results. He is walking around campus, but hasn't starting walking around the track. Moira reports that he does have an alcoholic beverage every now and then. He is going on vacation in a couple of weeks. He was educated on the importance of eating healthy snacks while traveling.       PLAN:  - Patient will increase ADL's and walk around campus.   - Patient will follow dietary advice from bariatric dietician, Christelle BURDICK    It is my professional opinion that patient is in the action stage of behavior change.    AMIE Gilbert, CPT, CHC

## 2025-06-25 NOTE — PROGRESS NOTES
NON-SURGICAL WEIGHT LOSS FOLLOW UP    Height:   Ht Readings from Last 1 Encounters:   05/21/25 6' (1.829 m)      Weight:   Wt Readings from Last 3 Encounters:   06/25/25 1349 (!) 178.4 kg (393 lb 3.2 oz)   05/21/25 0906 (!) 178.8 kg (394 lb 3.2 oz)   05/21/25 0837 (!) 178.7 kg (394 lb)      Weight loss since previous visit:     Changes in dietary patterns since previous visit:        Protein brownie  Cheese pack snack sargento  Pre done salad (lettuce cheese chicken crunch lundy carrots ranch dressing) - bag of protein chips and pack of nuts   D- cheese tortilla cheese lettuce, meat (3) w protein chips   Protein shake and protein brownie     Plan:         Weight loss since previous visit: -0#     Changes in dietary patterns since previous visit:  Pt struggling with burnout of same meals- discussed incorporating new recipes  Still may be overeating starches/fats- advised cutting out starch at dinner time  Had went out to eat/more crawfish boils with sauce this past month  Also drinking 1-2 sugary alcoholic beverages on weekends to tryout before vacation in July  Eating 3-4 protein supplements/bars daily- advised to replace 2-3 with whole foods instead (gave him ideas)  Still having ice cream cravings - suggested low katie ice cream bar to keep just in case as better alternative     Plan:   Replace supplements with more whole food options  Aim to eat 2 cups of vegetables and 2 cups of fruits daily  No starches at dinners - only lean meat and veggies  Try Halo Top bar if having sweets craving ( katie)  Try out new recipes to replace burnout

## 2025-06-25 NOTE — PROGRESS NOTES
NON-SURGICAL WEIGHT LOSS FOLLOW UP    Height:   Ht Readings from Last 1 Encounters:   05/21/25 6' (1.829 m)      Weight:   Wt Readings from Last 3 Encounters:   06/25/25 1349 (!) 178.4 kg (393 lb 3.2 oz)   05/21/25 0906 (!) 178.8 kg (394 lb 3.2 oz)   05/21/25 0837 (!) 178.7 kg (394 lb)      Weight loss since previous visit: lost 1#    Changes in dietary patterns since previous visit:  Same diet pattern - feel as though he has had recent burnout on eating better  Has been working on increasing steps- 2,000-4,000 daily  Still including starches at dinner- still largest meal (see 24 hour recall)  Discussed with him he will either need to start meal prepping or changing exercise to see significant weight loss results    24 hour recall  B-Protein brownie  Sn- Cheese pack snack sargento  L- Pre done salad (lettuce cheese chicken crunch lundy carrots ranch dressing) - bag of protein chips and pack of nuts   D- 3 tortilla tacos with cheese chicken, lettuce and protein chips   Sn- Protein shake and protein brownie     Plan:   Increase steps to at least 4,000 calories daily  Start meal prepping to prevent overeating at lunch or dinner   Limit starches in diet to 1-2 servings daily (1/2 cup portion or 1 slice bread/small tortilla)  Alternate days of fasting until noon if desired- can discuss this more next month and start with increasing steps more

## 2025-06-27 ENCOUNTER — TELEPHONE (OUTPATIENT)
Dept: PHARMACY | Facility: CLINIC | Age: 60
End: 2025-06-27
Payer: COMMERCIAL

## 2025-06-27 NOTE — TELEPHONE ENCOUNTER
[facility]/Population Health Chart Review & Patient Outreach Details For Medication Adherence Project    Reason for Outreach Encounter: 3rd Party payor non-compliance report ([employer], [employer], C, etc)  2.  Patient Outreach Method: Reviewed patient chart   3.   Medication in question:    Hyperlipidemia Medications              atorvastatin (LIPITOR) 20 MG tablet Take 1 tablet (20 mg total) by mouth once daily.                  atorvastatin   last filled  5/07/25 for 90 day supply    4.  Reviewed and or Updates Made To: Patient Chart  5. Outreach Outcomes and/or actions taken: Patient filled medication and is on track to be adherent  Additional Notes:

## 2025-07-07 ENCOUNTER — OFFICE VISIT (OUTPATIENT)
Dept: URGENT CARE | Facility: CLINIC | Age: 60
End: 2025-07-07
Payer: COMMERCIAL

## 2025-07-07 VITALS
OXYGEN SATURATION: 96 % | WEIGHT: 315 LBS | SYSTOLIC BLOOD PRESSURE: 118 MMHG | RESPIRATION RATE: 18 BRPM | BODY MASS INDEX: 42.66 KG/M2 | HEIGHT: 72 IN | HEART RATE: 60 BPM | TEMPERATURE: 98 F | DIASTOLIC BLOOD PRESSURE: 80 MMHG

## 2025-07-07 DIAGNOSIS — L03.119 CELLULITIS OF FOOT: Primary | ICD-10-CM

## 2025-07-07 PROCEDURE — 96372 THER/PROPH/DIAG INJ SC/IM: CPT | Mod: ,,, | Performed by: FAMILY MEDICINE

## 2025-07-07 PROCEDURE — 99203 OFFICE O/P NEW LOW 30 MIN: CPT | Mod: 25,,, | Performed by: FAMILY MEDICINE

## 2025-07-07 RX ORDER — CEPHALEXIN 500 MG/1
500 CAPSULE ORAL EVERY 6 HOURS
Qty: 20 CAPSULE | Refills: 0 | Status: SHIPPED | OUTPATIENT
Start: 2025-07-07 | End: 2025-07-12

## 2025-07-07 RX ORDER — CEFTRIAXONE 1 G/1
1 INJECTION, POWDER, FOR SOLUTION INTRAMUSCULAR; INTRAVENOUS
Status: COMPLETED | OUTPATIENT
Start: 2025-07-07 | End: 2025-07-07

## 2025-07-07 RX ORDER — PREDNISONE 20 MG/1
40 TABLET ORAL DAILY
Qty: 6 TABLET | Refills: 0 | Status: SHIPPED | OUTPATIENT
Start: 2025-07-08 | End: 2025-07-11

## 2025-07-07 RX ADMIN — CEFTRIAXONE 1 G: 1 INJECTION, POWDER, FOR SOLUTION INTRAMUSCULAR; INTRAVENOUS at 04:07

## 2025-07-07 NOTE — PROGRESS NOTES
Patient ID: Moira Vick is a 60 y.o. male.  Chief Complaint: Nail Problem    HPI:   Patient presents here today for above reason.     Patient is a 60 y.o. male who presents to urgent care with complaints of suspected ingrown toenail to the great toe of the L foot x 3 day onset approx. (+) Throbbing pain, erythema, swelling. (-) Alleviating factors. Wishes to rule out possibility of infection--(exposure unclean environment, water, etc).     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 Omalley    CYST REMOVAL Right     FOOT    FOOT SURGERY      X2    TONSILLECTOMY AND ADENOIDECTOMY       Review of patient's allergies indicates:  No Known Allergies  Current Outpatient Medications   Medication Instructions    atorvastatin (LIPITOR) 20 mg, Oral, Daily    cephALEXin (KEFLEX) 500 mg, Oral, Every 6 hours    fluticasone propionate (FLONASE) 50 mcg, Each Nostril, Daily    Lactobacillus rhamnosus GG (CULTURELLE) 10 billion cell capsule 1 capsule, Daily    losartan-hydrochlorothiazide 100-25 mg (HYZAAR) 100-25 mg per tablet 1 tablet, Oral, Daily    phentermine (ADIPEX-P) 37.5 mg, Oral, Daily    [START ON 7/8/2025] predniSONE (DELTASONE) 40 mg, Oral, Daily     Social History[1]    ROS:   Review of Systems  12 point review of systems conducted, negative except as stated in the history of present illness. See HPI for details.   Vitals/PE:   Visit Vitals  Ht 6' (1.829 m)   Wt (!) 178.4 kg (393 lb 3.2 oz)   BMI 53.33 kg/m²     Physical Exam  Vitals and nursing note reviewed.   Constitutional:       General: He  is not in acute distress.     Appearance: He is obese. He is not ill-appearing, toxic-appearing or diaphoretic.   HENT:      Head: Normocephalic and atraumatic.      Right Ear: Tympanic membrane normal.      Left Ear: Tympanic membrane normal.      Mouth/Throat:      Mouth: Mucous membranes are dry.      Pharynx: Oropharynx is clear.   Eyes:      Extraocular Movements: Extraocular movements intact.      Conjunctiva/sclera: Conjunctivae normal.      Pupils: Pupils are equal, round, and reactive to light.   Neck:      Vascular: No carotid bruit.   Cardiovascular:      Rate and Rhythm: Normal rate and regular rhythm.      Pulses: Normal pulses.      Heart sounds: Normal heart sounds. No murmur heard.     No friction rub. No gallop.   Pulmonary:      Effort: Pulmonary effort is normal. No respiratory distress.      Breath sounds: No stridor. No wheezing or rhonchi.   Abdominal:      General: There is no distension.      Palpations: There is no mass.      Tenderness: There is no abdominal tenderness. There is no left CVA tenderness, guarding or rebound.      Hernia: No hernia is present.   Musculoskeletal:         General: No swelling or signs of injury. Normal range of motion.      Cervical back: Normal range of motion and neck supple. No rigidity or tenderness.      Right lower leg: No edema.      Left lower leg: No edema.        Feet:    Lymphadenopathy:      Cervical: No cervical adenopathy.   Skin:     Capillary Refill: Capillary refill takes less than 2 seconds.      Coloration: Skin is not jaundiced.      Findings: No bruising, lesion or rash.   Neurological:      General: No focal deficit present.      Mental Status: He is alert and oriented to person, place, and time. Mental status is at baseline.      Cranial Nerves: No cranial nerve deficit.      Sensory: No sensory deficit.      Motor: No weakness.      Coordination: Coordination normal.      Gait: Gait normal.   Psychiatric:         Mood and Affect: Mood  normal.         Behavior: Behavior normal.         Thought Content: Thought content normal.         Judgment: Judgment normal.         Results for orders placed or performed in visit on 05/08/25   Urinalysis    Collection Time: 05/08/25  2:20 PM   Result Value Ref Range    Color, UA Yellow Yellow, Light-Yellow, Dark Yellow, Nancy, Straw    Appearance, UA Clear Clear    Specific Gravity, UA >=1.030 1.005 - 1.030    pH, UA 7.0 5.0 - 8.5    Protein, UA Negative Negative    Glucose, UA Negative Negative, Normal    Ketones, UA Negative Negative    Blood, UA Negative Negative    Bilirubin, UA Negative Negative    Urobilinogen, UA 0.2 0.2, 1.0, Normal    Nitrites, UA Negative Negative    Leukocyte Esterase, UA Negative Negative   Urinalysis, Microscopic    Collection Time: 05/08/25  2:20 PM   Result Value Ref Range    Bacteria, UA None Seen None Seen, Rare, Occasional /HPF    RBC, UA None Seen None Seen, 0-2, 3-5, 0-5 /HPF    WBC, UA 0-2 None Seen, 0-2, 3-5, 0-5 /HPF    Squamous Epithelial Cells, UA Rare None Seen, Rare, Occasional, Occ /HPF     Assessment/Plan:   Cellulitis of foot  -     cefTRIAXone injection 1 g  -     predniSONE (DELTASONE) 20 MG tablet; Take 2 tablets (40 mg total) by mouth once daily. for 3 days  Dispense: 6 tablet; Refill: 0  -     cephALEXin (KEFLEX) 500 MG capsule; Take 1 capsule (500 mg total) by mouth every 6 (six) hours. for 5 days  Dispense: 20 capsule; Refill: 0     If no improvement occurs over the next 72 hours.  Return to clinic for procedural treatment.      Education and counseling done face to face regarding medical conditions and plan. Contact office if new symptoms develop. Should any symptoms ever significantly worsen seek emergency medical attention/go to ER. Follow up at least yearly for wellness or sooner PRN. Nurse to call patient with any results. The patient is receptive, expresses understanding and is agreeable to plan. All questions have been answered.             [1]    Social History  Socioeconomic History    Marital status:     Number of children: 2   Occupational History    Occupation: Director   Tobacco Use    Smoking status: Former     Current packs/day: 0.00     Average packs/day: 0.5 packs/day for 2.0 years (1.0 ttl pk-yrs)     Types: Cigarettes, Cigars     Start date:      Quit date:      Years since quittin.5    Smokeless tobacco: Never   Substance and Sexual Activity    Alcohol use: Not Currently     Comment: rare    Drug use: Never    Sexual activity: Yes     Partners: Female

## 2025-07-07 NOTE — PATIENT INSTRUCTIONS
Assessment/Plan:   Cellulitis of foot  -     cefTRIAXone injection 1 g  -     predniSONE (DELTASONE) 20 MG tablet; Take 2 tablets (40 mg total) by mouth once daily. for 3 days  Dispense: 6 tablet; Refill: 0  -     cephALEXin (KEFLEX) 500 MG capsule; Take 1 capsule (500 mg total) by mouth every 6 (six) hours. for 5 days  Dispense: 20 capsule; Refill: 0     If no improvement occurs over the next 72 hours.  Return to clinic for procedural treatment.      Education and counseling done face to face regarding medical conditions and plan. Contact office if new symptoms develop. Should any symptoms ever significantly worsen seek emergency medical attention/go to ER. Follow up at least yearly for wellness or sooner PRN. Nurse to call patient with any results. The patient is receptive, expresses understanding and is agreeable to plan. All questions have been answered.

## 2025-07-11 ENCOUNTER — OFFICE VISIT (OUTPATIENT)
Dept: URGENT CARE | Facility: CLINIC | Age: 60
End: 2025-07-11
Payer: COMMERCIAL

## 2025-07-11 VITALS
BODY MASS INDEX: 42.66 KG/M2 | RESPIRATION RATE: 18 BRPM | SYSTOLIC BLOOD PRESSURE: 147 MMHG | OXYGEN SATURATION: 98 % | WEIGHT: 315 LBS | TEMPERATURE: 98 F | DIASTOLIC BLOOD PRESSURE: 85 MMHG | HEIGHT: 72 IN | HEART RATE: 60 BPM

## 2025-07-11 DIAGNOSIS — L02.629: ICD-10-CM

## 2025-07-11 DIAGNOSIS — L03.039 PARONYCHIA OF GREAT TOE: Primary | ICD-10-CM

## 2025-07-11 PROCEDURE — 87070 CULTURE OTHR SPECIMN AEROBIC: CPT | Performed by: FAMILY MEDICINE

## 2025-07-11 NOTE — PROGRESS NOTES
Patient ID: Moira Vick is a 60 y.o. male.  Chief Complaint: Nail Problem and Recurrent Skin Infections    HPI:   Patient presents here today for above reason.     Patient is a 60 y.o. male returning to urgent care requesting re-evaluation to rule out need for ingrown toe-nail removal/incision and drainage. Last seen, rx'd cephalexin 500 mg x 5 days (completed  by 07/15/2025), with Prednisone 40 mg x 3 days  (completed  by  07/11/2025). Reporting pain, new manifestations of fluid filled lesion near the toenail. Denies fever, exudate.     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 Omalley    CYST REMOVAL Right     FOOT    FOOT SURGERY      X2    TONSILLECTOMY AND ADENOIDECTOMY       Review of patient's allergies indicates:  No Known Allergies  Current Outpatient Medications   Medication Instructions    atorvastatin (LIPITOR) 20 mg, Oral, Daily    cephALEXin (KEFLEX) 500 mg, Oral, Every 6 hours    fluticasone propionate (FLONASE) 50 mcg, Each Nostril, Daily    Lactobacillus rhamnosus GG (CULTURELLE) 10 billion cell capsule 1 capsule, Daily    losartan-hydrochlorothiazide 100-25 mg (HYZAAR) 100-25 mg per tablet 1 tablet, Oral, Daily    phentermine (ADIPEX-P) 37.5 mg, Oral, Daily    predniSONE (DELTASONE) 40 mg, Oral, Daily     Social History[1]    ROS:   Review of Systems  12 point review of systems conducted, negative except as stated in the history of present illness. See HPI for details.   Vitals/PE:   Visit Vitals  BP (!) 147/85 (Patient Position: Sitting)   Pulse 60   Temp 98 °F (36.7 °C)    Resp 18   Ht 6' (1.829 m)   Wt (!) 178.4 kg (393 lb 3.2 oz)   SpO2 98%   BMI 53.33 kg/m²     Physical Exam  Vitals and nursing note reviewed.   Constitutional:       General: He is not in acute distress.     Appearance: He is not ill-appearing, toxic-appearing or diaphoretic.   HENT:      Head: Normocephalic and atraumatic.      Right Ear: Tympanic membrane normal.      Left Ear: Tympanic membrane normal.      Mouth/Throat:      Mouth: Mucous membranes are dry.      Pharynx: Oropharynx is clear.   Eyes:      Extraocular Movements: Extraocular movements intact.      Conjunctiva/sclera: Conjunctivae normal.      Pupils: Pupils are equal, round, and reactive to light.   Neck:      Vascular: No carotid bruit.   Cardiovascular:      Rate and Rhythm: Normal rate and regular rhythm.      Pulses: Normal pulses.      Heart sounds: Normal heart sounds. No murmur heard.     No friction rub. No gallop.   Pulmonary:      Effort: Pulmonary effort is normal. No respiratory distress.      Breath sounds: No stridor. No wheezing or rhonchi.   Abdominal:      General: There is no distension.      Palpations: There is no mass.      Tenderness: There is no abdominal tenderness. There is no left CVA tenderness, guarding or rebound.      Hernia: No hernia is present.   Musculoskeletal:         General: No swelling or signs of injury. Normal range of motion.      Cervical back: Normal range of motion and neck supple. No rigidity or tenderness.      Right lower leg: No edema.      Left lower leg: No edema.        Feet:    Lymphadenopathy:      Cervical: No cervical adenopathy.   Skin:     Capillary Refill: Capillary refill takes less than 2 seconds.      Coloration: Skin is not jaundiced.      Findings: No bruising, lesion or rash.   Neurological:      General: No focal deficit present.      Mental Status: He is alert and oriented to person, place, and time. Mental status is at baseline.      Cranial Nerves: No cranial nerve deficit.       "Sensory: No sensory deficit.      Motor: No weakness.      Coordination: Coordination normal.      Gait: Gait normal.   Psychiatric:         Mood and Affect: Mood normal.         Behavior: Behavior normal.         Thought Content: Thought content normal.         Judgment: Judgment normal.         Results for orders placed or performed in visit on 05/08/25   Urinalysis    Collection Time: 05/08/25  2:20 PM   Result Value Ref Range    Color, UA Yellow Yellow, Light-Yellow, Dark Yellow, Nancy, Straw    Appearance, UA Clear Clear    Specific Gravity, UA >=1.030 1.005 - 1.030    pH, UA 7.0 5.0 - 8.5    Protein, UA Negative Negative    Glucose, UA Negative Negative, Normal    Ketones, UA Negative Negative    Blood, UA Negative Negative    Bilirubin, UA Negative Negative    Urobilinogen, UA 0.2 0.2, 1.0, Normal    Nitrites, UA Negative Negative    Leukocyte Esterase, UA Negative Negative   Urinalysis, Microscopic    Collection Time: 05/08/25  2:20 PM   Result Value Ref Range    Bacteria, UA None Seen None Seen, Rare, Occasional /HPF    RBC, UA None Seen None Seen, 0-2, 3-5, 0-5 /HPF    WBC, UA 0-2 None Seen, 0-2, 3-5, 0-5 /HPF    Squamous Epithelial Cells, UA Rare None Seen, Rare, Occasional, Occ /HPF     Assessment/Plan:   Paronychia of great toe  Incision & Drainage    Date/Time: 7/11/2025 5:00 PM    Performed by: Jef Berg FNP  Authorized by: Jef Berg FNP    Time out: Immediately prior to procedure a "time out" was called to verify the correct patient, procedure, equipment, support staff and site/side marked as required.    Consent Done?:  Yes (Verbal) and Yes (Written)    Type:  Abscess  Body area:  Lower extremity  Location details:  Left big toe  Risk factor:  Underlying major vessel  Scalpel size:  11  Incision type:  Single straight  Incision depth: dermal    Complexity:  Simple  Drainage:  Pus and purulent  Drainage amount:  Copious  Wound treatment:  Incision, drainage, expression of material and " wound left open  Patient tolerance:  Patient tolerated the procedure well with no immediate complications    Separation of the superficial epidermal region  during boil/blister formation.  This area was debrided using sterile scissors and pickups.  A copious amount of drainage and discharge was expelled from the area with manual manipulation.  The wound was cleaned extensively and covered with topical antibiotic and sterile bandage.  Wound care instructions given discussed at great length.  A wound culture was obtained from the material expressed from the wound.  We will notify patient of need or lack thereof of adjustment in antibiotics etc.     Furuncle of toe, unspecified laterality       No orders of the defined types were placed in this encounter.      Education and counseling done face to face regarding medical conditions and plan. Contact office if new symptoms develop. Should any symptoms ever significantly worsen seek emergency medical attention/go to ER. Follow up at least yearly for wellness or sooner PRN. Nurse to call patient with any results. The patient is receptive, expresses understanding and is agreeable to plan. All questions have been answered.  No follow-ups on file.           [1]   Social History  Socioeconomic History    Marital status:     Number of children: 2   Occupational History    Occupation: Director   Tobacco Use    Smoking status: Former     Current packs/day: 0.00     Average packs/day: 0.5 packs/day for 2.0 years (1.0 ttl pk-yrs)     Types: Cigarettes, Cigars     Start date:      Quit date: 2018     Years since quittin.5    Smokeless tobacco: Never   Substance and Sexual Activity    Alcohol use: Not Currently     Comment: rare    Drug use: Never    Sexual activity: Yes     Partners: Female

## 2025-07-11 NOTE — PATIENT INSTRUCTIONS
"  Assessment/Plan:   Paronychia of great toe  Incision & Drainage     Date/Time: 7/11/2025 5:00 PM     Performed by: Jef Berg FNP  Authorized by: Jef Berg FNP    Time out: Immediately prior to procedure a "time out" was called to verify the correct patient, procedure, equipment, support staff and site/side marked as required.     Consent Done?:  Yes (Verbal) and Yes (Written)     Type:  Abscess  Body area:  Lower extremity  Location details:  Left big toe  Risk factor:  Underlying major vessel  Scalpel size:  11  Incision type:  Single straight  Incision depth: dermal     Complexity:  Simple  Drainage:  Pus and purulent  Drainage amount:  Copious  Wound treatment:  Incision, drainage, expression of material and wound left open  Patient tolerance:  Patient tolerated the procedure well with no immediate complications     Separation of the superficial epidermal region  during boil/blister formation.  This area was debrided using sterile scissors and pickups.  A copious amount of drainage and discharge was expelled from the area with manual manipulation.  The wound was cleaned extensively and covered with topical antibiotic and sterile bandage.  Wound care instructions given discussed at great length.  A wound culture was obtained from the material expressed from the wound.  We will notify patient of need or lack thereof of adjustment in antibiotics etc.     Furuncle of toe, unspecified laterality     "

## 2025-08-04 ENCOUNTER — CLINICAL SUPPORT (OUTPATIENT)
Dept: BARIATRICS | Facility: HOSPITAL | Age: 60
End: 2025-08-04

## 2025-08-04 VITALS — BODY MASS INDEX: 52.72 KG/M2 | WEIGHT: 315 LBS

## 2025-08-04 DIAGNOSIS — E66.01 MORBID OBESITY WITH BMI OF 50.0-59.9, ADULT: Primary | ICD-10-CM

## 2025-08-04 PROCEDURE — 94200034 HC BARIATRIC NUTRITIONAL SVCS PT GRADE I

## 2025-08-04 NOTE — PROGRESS NOTES
NON-SURGICAL WEIGHT LOSS FOLLOW UP    Height:   Ht Readings from Last 1 Encounters:   07/11/25 6' (1.829 m)      Weight:   Wt Readings from Last 3 Encounters:   08/04/25 0814 (!) 176.3 kg (388 lb 11.2 oz)   07/11/25 1701 (!) 178.4 kg (393 lb 3.2 oz)   07/07/25 1613 (!) 178.4 kg (393 lb 3.2 oz)      Weight loss since previous visit: lost 5#s    Changes in dietary patterns since previous visit:  Back from vacation in [place] (away for 10-12 days)  More activity on beach - walking up and down stairs and on sand throughout day  Activity since home has been inconsistent- some days only getting 2,000 steps and others reaching 4,000 steps  Meals have not changed much - he did bring better snacks with him on trip    24 hour recall since home:   B- Protein brownie  Sn- Cheese pack snack caterina  L- Pre made salad (lettuce cheese chicken crunch lundy carrots ranch dressing) - bag of protein chips and pack of nuts   D- 3 tortilla tacos with cheese chicken, lettuce and protein chips   Sn- Protein shake and protein brownie     Plan:   Increase steps to 4,000-5,000 steps daily  Have 10-15 minutes of planned walking time in the cool of evening- make sure heart rate elevated  Alternate days of fasting until noon if desired to help lower weekly average of calorie intake